# Patient Record
Sex: MALE | Race: BLACK OR AFRICAN AMERICAN | Employment: OTHER | URBAN - METROPOLITAN AREA
[De-identification: names, ages, dates, MRNs, and addresses within clinical notes are randomized per-mention and may not be internally consistent; named-entity substitution may affect disease eponyms.]

---

## 2017-11-03 ENCOUNTER — ANESTHESIA (OUTPATIENT)
Dept: ENDOSCOPY | Age: 77
End: 2017-11-03
Payer: MEDICARE

## 2017-11-03 ENCOUNTER — ANESTHESIA EVENT (OUTPATIENT)
Dept: ENDOSCOPY | Age: 77
End: 2017-11-03
Payer: MEDICARE

## 2017-11-03 ENCOUNTER — HOSPITAL ENCOUNTER (OUTPATIENT)
Age: 77
Setting detail: OUTPATIENT SURGERY
Discharge: HOME | End: 2017-11-03
Attending: COLON & RECTAL SURGERY | Admitting: COLON & RECTAL SURGERY
Payer: MEDICARE

## 2017-11-03 VITALS
OXYGEN SATURATION: 97 % | HEIGHT: 65 IN | DIASTOLIC BLOOD PRESSURE: 59 MMHG | BODY MASS INDEX: 27.49 KG/M2 | HEART RATE: 73 BPM | RESPIRATION RATE: 14 BRPM | WEIGHT: 165 LBS | SYSTOLIC BLOOD PRESSURE: 92 MMHG | TEMPERATURE: 98 F

## 2017-11-03 PROCEDURE — 74011000250 HC RX REV CODE- 250

## 2017-11-03 PROCEDURE — 76060000031 HC ANESTHESIA FIRST 0.5 HR: Performed by: COLON & RECTAL SURGERY

## 2017-11-03 PROCEDURE — 74011250637 HC RX REV CODE- 250/637: Performed by: COLON & RECTAL SURGERY

## 2017-11-03 PROCEDURE — 77030027957 HC TBNG IRR ENDOGTR BUSS -B: Performed by: COLON & RECTAL SURGERY

## 2017-11-03 PROCEDURE — 74011250636 HC RX REV CODE- 250/636: Performed by: COLON & RECTAL SURGERY

## 2017-11-03 PROCEDURE — 76040000019: Performed by: COLON & RECTAL SURGERY

## 2017-11-03 PROCEDURE — 74011250636 HC RX REV CODE- 250/636

## 2017-11-03 RX ORDER — SODIUM CHLORIDE 0.9 % (FLUSH) 0.9 %
5-10 SYRINGE (ML) INJECTION AS NEEDED
Status: ACTIVE | OUTPATIENT
Start: 2017-11-03 | End: 2017-11-03

## 2017-11-03 RX ORDER — FLUMAZENIL 0.1 MG/ML
0.2 INJECTION INTRAVENOUS
Status: ACTIVE | OUTPATIENT
Start: 2017-11-03 | End: 2017-11-03

## 2017-11-03 RX ORDER — ATROPINE SULFATE 0.1 MG/ML
0.5 INJECTION INTRAVENOUS
Status: ACTIVE | OUTPATIENT
Start: 2017-11-03 | End: 2017-11-03

## 2017-11-03 RX ORDER — WARFARIN SODIUM 5 MG/1
5 TABLET ORAL DAILY
COMMUNITY

## 2017-11-03 RX ORDER — SODIUM CHLORIDE 9 MG/ML
INJECTION, SOLUTION INTRAVENOUS
Status: DISCONTINUED | OUTPATIENT
Start: 2017-11-03 | End: 2017-11-03 | Stop reason: HOSPADM

## 2017-11-03 RX ORDER — NALOXONE HYDROCHLORIDE 0.4 MG/ML
0.4 INJECTION, SOLUTION INTRAMUSCULAR; INTRAVENOUS; SUBCUTANEOUS
Status: ACTIVE | OUTPATIENT
Start: 2017-11-03 | End: 2017-11-03

## 2017-11-03 RX ORDER — SODIUM CHLORIDE 0.9 % (FLUSH) 0.9 %
5-10 SYRINGE (ML) INJECTION EVERY 8 HOURS
Status: DISCONTINUED | OUTPATIENT
Start: 2017-11-03 | End: 2017-11-04 | Stop reason: HOSPADM

## 2017-11-03 RX ORDER — LIDOCAINE HYDROCHLORIDE 20 MG/ML
INJECTION, SOLUTION EPIDURAL; INFILTRATION; INTRACAUDAL; PERINEURAL AS NEEDED
Status: DISCONTINUED | OUTPATIENT
Start: 2017-11-03 | End: 2017-11-03 | Stop reason: HOSPADM

## 2017-11-03 RX ORDER — DEXTROMETHORPHAN/PSEUDOEPHED 2.5-7.5/.8
1.2 DROPS ORAL
Status: DISCONTINUED | OUTPATIENT
Start: 2017-11-03 | End: 2017-11-04 | Stop reason: HOSPADM

## 2017-11-03 RX ORDER — WARFARIN 2.5 MG/1
2.5 TABLET ORAL DAILY
COMMUNITY

## 2017-11-03 RX ORDER — SODIUM CHLORIDE 9 MG/ML
50 INJECTION, SOLUTION INTRAVENOUS CONTINUOUS
Status: DISPENSED | OUTPATIENT
Start: 2017-11-03 | End: 2017-11-03

## 2017-11-03 RX ORDER — EPINEPHRINE 0.1 MG/ML
1 INJECTION INTRACARDIAC; INTRAVENOUS
Status: ACTIVE | OUTPATIENT
Start: 2017-11-03 | End: 2017-11-03

## 2017-11-03 RX ORDER — PROPOFOL 10 MG/ML
INJECTION, EMULSION INTRAVENOUS AS NEEDED
Status: DISCONTINUED | OUTPATIENT
Start: 2017-11-03 | End: 2017-11-03 | Stop reason: HOSPADM

## 2017-11-03 RX ADMIN — LIDOCAINE HYDROCHLORIDE 40 MG: 20 INJECTION, SOLUTION EPIDURAL; INFILTRATION; INTRACAUDAL; PERINEURAL at 11:22

## 2017-11-03 RX ADMIN — PROPOFOL 50 MG: 10 INJECTION, EMULSION INTRAVENOUS at 11:32

## 2017-11-03 RX ADMIN — PROPOFOL 50 MG: 10 INJECTION, EMULSION INTRAVENOUS at 11:25

## 2017-11-03 RX ADMIN — PROPOFOL 50 MG: 10 INJECTION, EMULSION INTRAVENOUS at 11:29

## 2017-11-03 RX ADMIN — SODIUM CHLORIDE: 9 INJECTION, SOLUTION INTRAVENOUS at 11:18

## 2017-11-03 RX ADMIN — PROPOFOL 50 MG: 10 INJECTION, EMULSION INTRAVENOUS at 11:22

## 2017-11-03 NOTE — ANESTHESIA POSTPROCEDURE EVALUATION
Post-Anesthesia Evaluation and Assessment    Patient: Alivia Foster MRN: [id number]  SSN: [id number]    YOB: 1940  Age: 78 y.o. Sex: male       Cardiovascular Function/Vital Signs  Visit Vitals    BP 92/59    Pulse 73    Temp 36.7 °C (98 °F)    Resp 14    Ht 5' 5\" (1.651 m)    Wt 74.8 kg (165 lb)    SpO2 97%    BMI 27.46 kg/m2       Patient is status post MAC anesthesia for Procedure(s):  COLONOSCOPY. Nausea/Vomiting: None    Postoperative hydration reviewed and adequate. Pain:  Pain Scale 1: Numeric (0 - 10) (11/03/17 1142)  Pain Intensity 1: 0 (11/03/17 1142)   Managed    Neurological Status: At baseline    Mental Status and Level of Consciousness: Arousable    Pulmonary Status:   O2 Device: CO2 nasal cannula (11/03/17 1133)   Adequate oxygenation and airway patent    Complications related to anesthesia: None    Post-anesthesia assessment completed.  No concerns    Signed By: Jm Piper MD     November 3, 2017

## 2017-11-03 NOTE — ROUTINE PROCESS
Benedictnuvia Giles  1940  [id number]    Situation:  Verbal report received from: Lenora Levine  Procedure: Procedure(s):  COLONOSCOPY    Background:    Preoperative diagnosis: PER HX COLON POLYPS  Postoperative diagnosis: 1.- Diverticulosis    :  Dr. Rocky Casas  Assistant(s): Endoscopy Technician-1: Cris Kumar  Endoscopy RN-1: Kuldeep Morocho, RN    Specimens: * No specimens in log *  H. Pylori  no    Assessment:  Intra-procedure medications   Anesthesia gave intra-procedure sedation and medications, see anesthesia flow sheet yes    Intravenous fluids: NS@ KVO     Vital signs stable yes    Abdominal assessment: round and soft  Yes     Recommendation:  Discharge patient per MD order y.   Return to floor na  Family or Friend  fam  Permission to share finding with family or friend yes

## 2017-11-03 NOTE — OP NOTES
Vivian Acuña  [id number]  1940    COLON DISCHARGE INSTRUCTIONS  Discomfort:  Redness at IV site- apply warm compress to area; if redness or soreness persist- contact your physician  There may be a slight amount of blood passed from the rectum  Gaseous discomfort- walking, belching will help relieve any discomfort  You may not operate a vehicle for 12 hours  You may not engage in an occupation involving machinery or appliances for rest of today  You may not drink alcoholic beverages for at least 12 hours  Avoid making any critical decisions for at least 24 hour  DIET:   High fiber diet.  however -  remember your colon is empty and a heavy meal will produce gas. Avoid these foods:  vegetables, fried / greasy foods, carbonated drinks for today    MEDICATIONS:  Current Discharge Medication List      CONTINUE these medications which have NOT CHANGED    Details   !! warfarin (COUMADIN) 5 mg tablet Take 5 mg by mouth daily. Mon, Wed, Fri      !! warfarin (COUMADIN) 2.5 mg tablet Take 2.5 mg by mouth daily. Tues, Thurs, Sat, Sun      terazosin (HYTRIN) 5 mg capsule Take 5 mg by mouth daily. multivitamin (ONE A DAY) tablet Take 1 Tab by mouth daily. Cholecalciferol, Vitamin D3, (VITAMIN D3) 1,000 unit cap Take 1 Tab by mouth daily. cyanocobalamin (VITAMIN B-12) 500 mcg tablet Take 500 mcg by mouth daily. GLUC/CHND/OM3/DHA/EPA/FISH/STR (GLUCOSAMINE CHONDROITIN PLUS PO) Take 1 Tab by mouth daily. !! - Potential duplicate medications found. Please discuss with provider. ACTIVITY:  You may resume your normal daily activities it is recommended that you spend the remainder of the day resting -  avoid any strenuous activity. CALL M.D.   ANY SIGN OF:   Increasing pain, nausea, vomiting  Abdominal distension (swelling)  New increased bleeding (oral or rectal)  Fever (chills)  Pain in chest area  Bloody discharge from nose or mouth  Shortness of breath     Follow-up Instructions:   Call Eleazar Lima MD if any questions or problems. Telephone # [phone]  Biopsy results will be available in  7 to10 days  Should have a repeat colonoscopy in 5 years. COLONOSCOPY FINDINGS:  Your colonoscopy showed: normal colonoscopy.

## 2017-11-03 NOTE — DISCHARGE INSTRUCTIONS
Ana Joshua  [id number]  1940    COLON DISCHARGE INSTRUCTIONS  Discomfort:  Redness at IV site- apply warm compress to area; if redness or soreness persist- contact your physician  There may be a slight amount of blood passed from the rectum  Gaseous discomfort- walking, belching will help relieve any discomfort  You may not operate a vehicle for 12 hours  You may not engage in an occupation involving machinery or appliances for rest of today  You may not drink alcoholic beverages for at least 12 hours  Avoid making any critical decisions for at least 24 hour  DIET:   High fiber diet. - however -  remember your colon is empty and a heavy meal will produce gas. Avoid these foods:  vegetables, fried / greasy foods, carbonated drinks for today    MEDICATIONS:  resume       ACTIVITY:  You may resume your normal daily activities it is recommended that you spend the remainder of the day resting -  avoid any strenuous activity. CALL M.D. ANY SIGN OF:   Increasing pain, nausea, vomiting  Abdominal distension (swelling)  New increased bleeding (oral or rectal)  Fever (chills)  Pain in chest area  Bloody discharge from nose or mouth  Shortness of breath     Follow-up Instructions:   Call Jelani Juarez MD if any questions or problems. Telephone # [phone]  Biopsy results will be available in  7 to10 days  Should have a repeat colonoscopy in 5 years. COLONOSCOPY FINDINGS:  Your colonoscopy showed: normal colon.

## 2017-11-03 NOTE — IP AVS SNAPSHOT
[address] 
[phone] Patient: Oliver Dow MRN: [id number] [id number] About your hospitalization You were admitted on:  November 3, 2017 You last received care in the:  [facility] ENDOSCOPY You were discharged on:  November 3, 2017 Why you were hospitalized Your primary diagnosis was:  Not on File Things You Need To Do (next 8 weeks) Follow up with Ely Simmons MD  
  
Phone:  [phone] Where:  [address] [facility], [facility]. [facility] [id number] Discharge Orders None A check mini indicates which time of day the medication should be taken. My Medications TAKE these medications as instructed Instructions Each Dose to Equal  
 Morning Noon Evening Bedtime * COUMADIN 5 mg tablet Generic drug:  warfarin Your last dose was: Your next dose is: Take 5 mg by mouth daily. Mon, Wed, Fri  
 5 mg * warfarin 2.5 mg tablet Commonly known as:  COUMADIN Your last dose was: Your next dose is: Take 2.5 mg by mouth daily. Tues, Thurs, Sat, Sun  
 2.5 mg  
    
   
   
   
  
 GLUCOSAMINE CHONDROITIN PLUS PO Your last dose was: Your next dose is: Take 1 Tab by mouth daily. 1 Tab  
    
   
   
   
  
 multivitamin tablet Commonly known as:  ONE A DAY Your last dose was: Your next dose is: Take 1 Tab by mouth daily. 1 Tab  
    
   
   
   
  
 terazosin 5 mg capsule Commonly known as:  HYTRIN Your last dose was: Your next dose is: Take 5 mg by mouth daily. 5 mg VITAMIN B-12 500 mcg tablet Generic drug:  cyanocobalamin Your last dose was: Your next dose is: Take 500 mcg by mouth daily. 500 mcg VITAMIN D3 1,000 unit Cap Generic drug:  cholecalciferol Your last dose was: Your next dose is: Take 1 Tab by mouth daily. 1 Tab * Notice: This list has 2 medication(s) that are the same as other medications prescribed for you. Read the directions carefully, and ask your doctor or other care provider to review them with you. Discharge Instructions Phil Richard [id number] 1940 COLON DISCHARGE INSTRUCTIONS Discomfort: 
Redness at IV site- apply warm compress to area; if redness or soreness persist- contact your physician There may be a slight amount of blood passed from the rectum Gaseous discomfort- walking, belching will help relieve any discomfort You may not operate a vehicle for 12 hours You may not engage in an occupation involving machinery or appliances for rest of today You may not drink alcoholic beverages for at least 12 hours Avoid making any critical decisions for at least 24 hour DIET: 
 High fiber diet.  however -  remember your colon is empty and a heavy meal will produce gas. Avoid these foods:  vegetables, fried / greasy foods, carbonated drinks for today MEDICATIONS: 
resume ACTIVITY: 
You may resume your normal daily activities it is recommended that you spend the remainder of the day resting -  avoid any strenuous activity. CALL M.D. ANY SIGN OF: Increasing pain, nausea, vomiting Abdominal distension (swelling) New increased bleeding (oral or rectal) Fever (chills) Pain in chest area Bloody discharge from nose or mouth Shortness of breath Follow-up Instructions: 
 Call Za Herron MD if any questions or problems. Telephone # [phone] Biopsy results will be available in  7 to10 days Should have a repeat colonoscopy in 5 years. COLONOSCOPY FINDINGS: 
Your colonoscopy showed: normal colon. Introducing [facility]! Deirdre Denney introduces [employer] patient portal. Now you can access parts of your medical record, email your doctor's office, and request medication refills online. 1. In your internet browser, go to [email or web address] 2. Click on the First Time User? Click Here link in the Sign In box. You will see the New Member Sign Up page. 3. Enter your [employer] Access Code exactly as it appears below. You will not need to use this code after youve completed the sign-up process. If you do not sign up before the expiration date, you must request a new code. · [employer] Access Code: [email or web address] Expires: 2/1/2018 11:46 AM 
 
4. Enter the last four digits of your Social Security Number (xxxx) and Date of Birth (mm/dd/yyyy) as indicated and click Submit. You will be taken to the next sign-up page. 5. Create a [employer] ID. This will be your [employer] login ID and cannot be changed, so think of one that is secure and easy to remember. 6. Create a [employer] password. You can change your password at any time. 7. Enter your Password Reset Question and Answer. This can be used at a later time if you forget your password. 8. Enter your e-mail address. You will receive e-mail notification when new information is available in [address] 9. Click Sign Up. You can now view and download portions of your medical record. 10. Click the Download Summary menu link to download a portable copy of your medical information. If you have questions, please visit the Frequently Asked Questions section of the [employer] website. Remember, [employer] is NOT to be used for urgent needs. For medical emergencies, dial 911. Now available from your iPhone and Android! Providers Seen During Your Hospitalization Provider Specialty Primary office phone Sudeep Agrawal MD Colon and Rectal Surgery [phone] Your Primary Care Physician (PCP) Primary Care Physician Office Phone Office Fax Angélica Borja [phone] You are allergic to the following No active allergies Recent Documentation Height Weight BMI Smoking Status 1.651 m 74.8 kg 27.46 kg/m2 Never Smoker Emergency Contacts Name Discharge Info Relation Home Work Mobile Carmenza Carpenter  Other Relative [6] [phone] Patient Belongings The following personal items are in your possession at time of discharge: 
  Dental Appliances: None  Visual Aid: None Please provide this summary of care documentation to your next provider. Signatures-by signing, you are acknowledging that this After Visit Summary has been reviewed with you and you have received a copy. Patient Signature:  ____________________________________________________________ Date:  ____________________________________________________________  
  
[facility] Signature:  ____________________________________________________________ Date:  ____________________________________________________________

## 2017-11-03 NOTE — H&P
Colon and Rectal Surgery History and Physical    Subjective:      Marquis Luna is a 78 y.o. male who has hx polyps    There are no active problems to display for this patient. Past Medical History:   Diagnosis Date    Chronic kidney disease     kidney stones    Other ill-defined conditions     pt takes coumadin pt/family not sure why    Seizures ([address]     history      Past Surgical History:   Procedure Laterality Date    HX OTHER SURGICAL      kidney stones      Social History   Substance Use Topics    Smoking status: Never Smoker    Smokeless tobacco: Not on file    Alcohol use No      No family history on file. Prior to Admission medications    Medication Sig Start Date End Date Taking? Authorizing Provider   warfarin (COUMADIN) 5 mg tablet Take 5 mg by mouth daily. Mon, Wed, Fri   Yes Historical Provider   warfarin (COUMADIN) 2.5 mg tablet Take 2.5 mg by mouth daily. Tues, Thurs, Sat, Sun   Yes Historical Provider   terazosin (HYTRIN) 5 mg capsule Take 5 mg by mouth daily. Yes Historical Provider   multivitamin (ONE A DAY) tablet Take 1 Tab by mouth daily. Yes Historical Provider   Cholecalciferol, Vitamin D3, (VITAMIN D3) 1,000 unit cap Take 1 Tab by mouth daily. Yes Historical Provider   cyanocobalamin (VITAMIN B-12) 500 mcg tablet Take 500 mcg by mouth daily. Yes Historical Provider   GLUC/CHND/OM3/DHA/EPA/FISH/STR (GLUCOSAMINE CHONDROITIN PLUS PO) Take 1 Tab by mouth daily. Historical Provider     No Known Allergies     Review of Systems:    A comprehensive review of systems was negative except for that written in the History of Present Illness.     Objective:     Visit Vitals    /85    Pulse 67    Temp 98 °F (36.7 °C)    Resp 17    Ht 5' 5\" (1.651 m)    Wt 74.8 kg (165 lb)    SpO2 95%    BMI 27.46 kg/m2        Physical Exam:   nad  Chest clear  Heart reg  abd soft    Imaging:  images and reports reviewed    Lab Review:  No results found for this or any previous visit (from the past 24 hour(s)). Labs and radiology: images and reports reviewed      Assessment:     Hx valerio    Plan:     1. I recommend proceeding with colonoscopy. Treatment alternatives were discussed. 2. Discussed aspects of surgical intervention, methods, risks (including by not limited to infection, bleeding, hematoma, and perforation of the intestines or solid organs), and the risks of general anesthetic. The patient understands the risks; any and all questions were answered to the patient's satisfaction.     Signed By: Samaria Carcamo MD     November 3, 2017

## 2017-11-03 NOTE — ANESTHESIA PREPROCEDURE EVALUATION
Anesthetic History   No history of anesthetic complications            Review of Systems / Medical History  Patient summary reviewed, nursing notes reviewed and pertinent labs reviewed    Pulmonary  Within defined limits                 Neuro/Psych   Within defined limits  seizures         Cardiovascular  Within defined limits                     GI/Hepatic/Renal  Within defined limits              Endo/Other  Within defined limits           Other Findings              Physical Exam    Airway  Mallampati: II  TM Distance: > 6 cm  Neck ROM: normal range of motion   Mouth opening: Normal     Cardiovascular  Regular rate and rhythm,  S1 and S2 normal,  no murmur, click, rub, or gallop             Dental  No notable dental hx       Pulmonary  Breath sounds clear to auscultation               Abdominal  GI exam deferred       Other Findings            Anesthetic Plan    ASA: 3  Anesthesia type: MAC          Induction: Intravenous  Anesthetic plan and risks discussed with: Patient and Spouse

## 2017-11-03 NOTE — OP NOTES
Colonoscopy Procedure Note    Indications: Previous adenomatous polyp    Anesthesia/Sedation: MAC anesthesia Propofol    Pre-Procedure Exam:  Airway: clear   Heart: normal S1and S2    Lungs: clear bilateral  Abdomen: soft, nontender, bowel sounds present and normal in all quadrants   Mental Status: awake, alert, and oriented to person, place, and time      Procedure in Detail:  Informed consent was obtained for the procedure, including sedation. Risks of perforation, hemorrhage, adverse drug reaction, and aspiration were discussed. The patient was placed in the left lateral decubitus position. Based on the pre-procedure assessment, including review of the patient's medical history, medications, allergies, and review of systems, he had been deemed to be an appropriate candidate for moderate sedation; he was therefore sedated with the medications listed above. The patient was monitored continuously with ECG tracing, pulse oximetry, blood pressure monitoring, and direct observations. A rectal examination was performed. The [id number] was inserted into the rectum and advanced under direct vision to the cecum, which was identified by the ileocecal valve and appendiceal orifice. The quality of the colonic preparation was excellent. A careful inspection was made as the colonoscope was withdrawn, including a retroflexed view of the rectum; findings and interventions are described below. Appropriate photodocumentation was obtained. Findings:   ANUS: Anal exam reveals no masses or hemorrhoids, sphincter tone is normal.   RECTUM: Rectal exam reveals no masses or hemorrhoids. SIGMOID COLON: The mucosa is normal with good vascular pattern and without ulcers, diverticula, and polyps. DESCENDING COLON: The mucosa is normal with good vascular pattern and without ulcers, diverticula, and polyps.    SPLENIC FLEXURE: The splenic flexure is normal.   TRANSVERSE COLON: The mucosa is normal with good vascular pattern and without ulcers, diverticula, and polyps. HEPATIC FLEXURE: The hepatic flexure is normal.   ASCENDING COLON: The mucosa is normal with good vascular pattern and without ulcers, diverticula, and polyps. CECUM: The appendiceal orifice appears normal. The ileocecal valve appears normal.   TERMINAL ILEUM: The terminal ileum was not entered. Specimens: No specimens were collected. EBL: None    Complications: None; patient tolerated the procedure well. Attending Attestation: I performed the procedure. Recommendations:   - Repeat colonoscopy in 5 years.     Signed By: Marixa Vigil MD                        November 3, 2017

## 2019-06-10 ENCOUNTER — HOSPITAL ENCOUNTER (OUTPATIENT)
Dept: ULTRASOUND IMAGING | Age: 79
Discharge: HOME | End: 2019-06-10
Attending: FAMILY MEDICINE
Payer: MEDICARE

## 2019-06-10 DIAGNOSIS — M79.89 LEFT LEG SWELLING: ICD-10-CM

## 2019-06-10 PROCEDURE — 93971 EXTREMITY STUDY: CPT

## 2019-06-28 ENCOUNTER — APPOINTMENT (OUTPATIENT)
Dept: GENERAL RADIOLOGY | Age: 79
End: 2019-06-28
Attending: EMERGENCY MEDICINE
Payer: MEDICARE

## 2019-06-28 ENCOUNTER — HOSPITAL ENCOUNTER (EMERGENCY)
Age: 79
Discharge: HOME | End: 2019-06-28
Attending: EMERGENCY MEDICINE
Payer: MEDICARE

## 2019-06-28 VITALS
SYSTOLIC BLOOD PRESSURE: 158 MMHG | WEIGHT: 156.75 LBS | HEART RATE: 73 BPM | BODY MASS INDEX: 26.08 KG/M2 | DIASTOLIC BLOOD PRESSURE: 70 MMHG | RESPIRATION RATE: 14 BRPM | TEMPERATURE: 97.9 F | OXYGEN SATURATION: 94 %

## 2019-06-28 DIAGNOSIS — R82.79 URINE CULTURE POSITIVE: Primary | ICD-10-CM

## 2019-06-28 LAB
ALBUMIN SERPL-MCNC: 3.5 G/DL (ref 3.5–5)
ALBUMIN/GLOB SERPL: 0.9 {RATIO} (ref 1.1–2.2)
ALP SERPL-CCNC: 86 U/L (ref 45–117)
ALT SERPL-CCNC: 9 U/L (ref 12–78)
ANION GAP SERPL CALC-SCNC: 7 MMOL/L (ref 5–15)
APPEARANCE UR: CLEAR
AST SERPL-CCNC: 18 U/L (ref 15–37)
ATRIAL RATE: 89 BPM
BACTERIA URNS QL MICRO: ABNORMAL /HPF
BASOPHILS # BLD: 0.1 K/UL (ref 0–0.1)
BASOPHILS NFR BLD: 1 % (ref 0–1)
BILIRUB SERPL-MCNC: 0.6 MG/DL (ref 0.2–1)
BILIRUB UR QL: NEGATIVE
BUN SERPL-MCNC: 26 MG/DL (ref 6–20)
BUN/CREAT SERPL: 23 (ref 12–20)
CALCIUM SERPL-MCNC: 9.3 MG/DL (ref 8.5–10.1)
CALCULATED P AXIS, ECG09: 8 DEGREES
CALCULATED R AXIS, ECG10: 67 DEGREES
CALCULATED T AXIS, ECG11: 55 DEGREES
CHLORIDE SERPL-SCNC: 104 MMOL/L (ref 97–108)
CO2 SERPL-SCNC: 29 MMOL/L (ref 21–32)
COLOR UR: ABNORMAL
COMMENT, HOLDF: NORMAL
CREAT SERPL-MCNC: 1.15 MG/DL (ref 0.7–1.3)
DIAGNOSIS, 93000: NORMAL
DIFFERENTIAL METHOD BLD: NORMAL
EOSINOPHIL # BLD: 0.1 K/UL (ref 0–0.4)
EOSINOPHIL NFR BLD: 1 % (ref 0–7)
EPITH CASTS URNS QL MICRO: ABNORMAL /LPF
ERYTHROCYTE [DISTWIDTH] IN BLOOD BY AUTOMATED COUNT: 13.2 % (ref 11.5–14.5)
GLOBULIN SER CALC-MCNC: 3.9 G/DL (ref 2–4)
GLUCOSE SERPL-MCNC: 117 MG/DL (ref 65–100)
GLUCOSE UR STRIP.AUTO-MCNC: NEGATIVE MG/DL
HCT VFR BLD AUTO: 43.7 % (ref 36.6–50.3)
HGB BLD-MCNC: 14.5 G/DL (ref 12.1–17)
HGB UR QL STRIP: ABNORMAL
IMM GRANULOCYTES # BLD AUTO: 0 K/UL (ref 0–0.04)
IMM GRANULOCYTES NFR BLD AUTO: 0 % (ref 0–0.5)
KETONES UR QL STRIP.AUTO: NEGATIVE MG/DL
LACTATE SERPL-SCNC: 1.4 MMOL/L (ref 0.4–2)
LEUKOCYTE ESTERASE UR QL STRIP.AUTO: ABNORMAL
LYMPHOCYTES # BLD: 1.9 K/UL (ref 0.8–3.5)
LYMPHOCYTES NFR BLD: 34 % (ref 12–49)
MCH RBC QN AUTO: 27.9 PG (ref 26–34)
MCHC RBC AUTO-ENTMCNC: 33.2 G/DL (ref 30–36.5)
MCV RBC AUTO: 84 FL (ref 80–99)
MONOCYTES # BLD: 0.4 K/UL (ref 0–1)
MONOCYTES NFR BLD: 8 % (ref 5–13)
NEUTS SEG # BLD: 3.1 K/UL (ref 1.8–8)
NEUTS SEG NFR BLD: 56 % (ref 32–75)
NITRITE UR QL STRIP.AUTO: NEGATIVE
NRBC # BLD: 0 K/UL (ref 0–0.01)
NRBC BLD-RTO: 0 PER 100 WBC
P-R INTERVAL, ECG05: 144 MS
PH UR STRIP: 5.5 [PH] (ref 5–8)
PLATELET # BLD AUTO: 209 K/UL (ref 150–400)
PMV BLD AUTO: 9.3 FL (ref 8.9–12.9)
POTASSIUM SERPL-SCNC: 3.9 MMOL/L (ref 3.5–5.1)
PROT SERPL-MCNC: 7.4 G/DL (ref 6.4–8.2)
PROT UR STRIP-MCNC: NEGATIVE MG/DL
Q-T INTERVAL, ECG07: 354 MS
QRS DURATION, ECG06: 86 MS
QTC CALCULATION (BEZET), ECG08: 430 MS
RBC # BLD AUTO: 5.2 M/UL (ref 4.1–5.7)
RBC #/AREA URNS HPF: ABNORMAL /HPF (ref 0–5)
SAMPLES BEING HELD,HOLD: NORMAL
SODIUM SERPL-SCNC: 140 MMOL/L (ref 136–145)
SP GR UR REFRACTOMETRY: 1.02 (ref 1–1.03)
UR CULT HOLD, URHOLD: NORMAL
UROBILINOGEN UR QL STRIP.AUTO: 0.2 EU/DL (ref 0.2–1)
VENTRICULAR RATE, ECG03: 89 BPM
WBC # BLD AUTO: 5.6 K/UL (ref 4.1–11.1)
WBC URNS QL MICRO: ABNORMAL /HPF (ref 0–4)

## 2019-06-28 PROCEDURE — 81001 URINALYSIS AUTO W/SCOPE: CPT

## 2019-06-28 PROCEDURE — 87086 URINE CULTURE/COLONY COUNT: CPT

## 2019-06-28 PROCEDURE — 87186 SC STD MICRODIL/AGAR DIL: CPT

## 2019-06-28 PROCEDURE — 99284 EMERGENCY DEPT VISIT MOD MDM: CPT

## 2019-06-28 PROCEDURE — 36415 COLL VENOUS BLD VENIPUNCTURE: CPT

## 2019-06-28 PROCEDURE — 85025 COMPLETE CBC W/AUTO DIFF WBC: CPT

## 2019-06-28 PROCEDURE — 87040 BLOOD CULTURE FOR BACTERIA: CPT

## 2019-06-28 PROCEDURE — 71046 X-RAY EXAM CHEST 2 VIEWS: CPT

## 2019-06-28 PROCEDURE — 87077 CULTURE AEROBIC IDENTIFY: CPT

## 2019-06-28 PROCEDURE — 83605 ASSAY OF LACTIC ACID: CPT

## 2019-06-28 PROCEDURE — 93005 ELECTROCARDIOGRAM TRACING: CPT

## 2019-06-28 PROCEDURE — 80053 COMPREHEN METABOLIC PANEL: CPT

## 2019-06-28 NOTE — ED NOTES
The patient was discharged home in stable condition. The patient is alert and oriented, in no respiratory distress. The patient's diagnosis, condition and treatment were explained by ER Physician. The patient expressed understanding. A discharge plan has been developed. Discharge instructions were given. Pt wheeled out of the ED by ER RN.

## 2019-06-28 NOTE — ED PROVIDER NOTES
The history is provided by the patient and the spouse. The history is limited by the condition of the patient. No  was used. Bladder Infection    This is a recurrent problem. The current episode started more than 1 week ago. The problem has not changed since onset. The quality of the pain is described as burning. The pain is at a severity of 0/10. The patient is experiencing no pain. There has been no fever. He is not sexually active. There is no history of pyelonephritis. Pertinent negatives include no chills, no sweats, no nausea, no vomiting, no discharge, no frequency, no hematuria, no hesitancy, no urgency and no flank pain. He has tried antibiotics for the symptoms. His past medical history is significant for kidney stones, urological procedure and recurrent UTIs. His past medical history does not include single kidney, urinary stasis or catheterization. Past Medical History:   Diagnosis Date    Chronic kidney disease     kidney stones    Other ill-defined conditions(799.89)     pt takes coumadin pt/family not sure why    Seizures ([facility] [address]     history       Past Surgical History:   Procedure Laterality Date    COLONOSCOPY N/A 11/3/2017    COLONOSCOPY performed by Za Herron MD at [facility] ENDOSCOPY    HX OTHER SURGICAL      kidney stones         History reviewed. No pertinent family history.     Social History     Socioeconomic History    Marital status: SINGLE     Spouse name: Not on file    Number of children: Not on file    Years of education: Not on file    Highest education level: Not on file   Occupational History    Not on file   Social Needs    Financial resource strain: Not on file    Food insecurity:     Worry: Not on file     Inability: Not on file    Transportation needs:     Medical: Not on file     Non-medical: Not on file   Tobacco Use    Smoking status: Never Smoker   Substance and Sexual Activity    Alcohol use: No    Drug use: No    Sexual activity: Not on file   Lifestyle    Physical activity:     Days per week: Not on file     Minutes per session: Not on file    Stress: Not on file   Relationships    Social connections:     Talks on phone: Not on file     Gets together: Not on file     Attends Spiritism service: Not on file     Active member of club or organization: Not on file     Attends meetings of clubs or organizations: Not on file     Relationship status: Not on file    Intimate partner violence:     Fear of current or ex partner: Not on file     Emotionally abused: Not on file     Physically abused: Not on file     Forced sexual activity: Not on file   Other Topics Concern    Not on file   Social History Narrative    Not on file         ALLERGIES: Patient has no known allergies. Review of Systems   Constitutional: Negative for activity change, chills and fever. HENT: Negative for nosebleeds, sore throat, trouble swallowing and voice change. Eyes: Negative for visual disturbance. Respiratory: Negative for shortness of breath. Cardiovascular: Negative for chest pain and palpitations. Gastrointestinal: Negative for abdominal pain, constipation, diarrhea, nausea and vomiting. Genitourinary: Positive for dysuria. Negative for difficulty urinating, flank pain, frequency, hematuria, hesitancy and urgency. Musculoskeletal: Negative for back pain, neck pain and neck stiffness. Skin: Negative for color change. Allergic/Immunologic: Negative for immunocompromised state. Neurological: Positive for dizziness. Negative for seizures, syncope, weakness, light-headedness, numbness and headaches. Psychiatric/Behavioral: Negative for behavioral problems, confusion, hallucinations, self-injury and suicidal ideas.        Vitals:    06/28/19 1705   BP: 123/75   Pulse: 91   Resp: 16   Temp: 98.5 °F (36.9 °C)   SpO2: 92%   Weight: 71.1 kg (156 lb 12 oz)            Physical Exam   Constitutional: He appears well-developed and well-nourished. No distress. HENT:   Head: Normocephalic and atraumatic. Eyes: Pupils are equal, round, and reactive to light. Neck: Normal range of motion. Neck supple. Cardiovascular: Normal rate, regular rhythm and normal heart sounds. Exam reveals no gallop and no friction rub. No murmur heard. Pulmonary/Chest: Effort normal and breath sounds normal. No respiratory distress. He has no wheezes. Abdominal: Soft. Bowel sounds are normal. He exhibits no distension. There is no tenderness. There is no rebound and no guarding. Musculoskeletal: Normal range of motion. Neurological: He is alert. He is disoriented. GCS eye subscore is 4. GCS verbal subscore is 5. GCS motor subscore is 6. Skin: Skin is warm. No rash noted. He is not diaphoretic. Psychiatric: He has a normal mood and affect. His behavior is normal. Judgment and thought content normal.   Nursing note and vitals reviewed. MDM     This is a 70year old male, with past medical history, review of systems, physical exam as above presenting with complaints of abnormal lab values. Patient with a history of Parkinson's disease, is noted to be a poor historian. Wife at the bedside states he was treated for urinary tract infection approximately one month ago, initially detected during a check up, however wife states he was asymptomatic at the time. Wife states he has been through 2 courses of different antibiotics over the last month, with culture results obtained this week showing ESBL. Klebsiella. Upon arrival, patient awake, alert, without complaints, noted to have tachycardia or hypotension, afebrile. Question colonization versus acute infection. Plan to obtain CMP, CBC, UA, chest x-ray, we'll likely start empiric antibiotics, admit, for further care and evaluation. Procedures    6:29 PM  Patient with unremarkable VS, labwork as well, unimpressive UA, called PCP on call (Dr. Pedro Luis Avila), she will access her won EMR and call back.      Gloria Cates unable to access Dr. Shane Waller records, patient well appearing, unimpressive UA and lab work. Will send cx, DC home with PCP f/u, return precautions given.

## 2019-06-28 NOTE — DISCHARGE INSTRUCTIONS
Patient Education        Urine Culture: About This Test  What is it? A urine culture is a test to find germs (such as bacteria) that can cause an infection. A sample of urine is added to a substance that promotes the growth of germs. If no germs grow, the culture is negative. If germs that can cause infection grow, the culture is positive. The type of germ may be identified using a microscope or chemical tests. Why is this test done? A urine culture may be done to:  · Find the cause of a urinary tract infection (UTI). · Make decisions about the best treatment for a UTI. · Find out whether treatment for a UTI worked. How can you prepare for the test?  You don't need to do anything before you have the test. If you are taking or have recently taken antibiotics, tell your doctor. What happens before the test?  You will need to drink enough fluids and avoid urinating so that you will be able to collect a urine sample. What happens during the test?  You will be asked to collect a clean-catch midstream urine sample for testing. The first urine of the day is best because bacteria levels will be higher. · Wash your hands before collecting the urine. · If the container has a lid, remove the lid of the container and set it down with the inner surface up. · Clean the area around your penis or vagina. · Begin urinating into the toilet or urinal.  · After the urine has flowed for several seconds, place the collection container in the stream and collect about 2 ounces (a quarter cup) of this \"midstream\" urine without stopping the flow. · Don't touch the rim of the container to your genital area. · Finish urinating into the toilet or urinal.  · Carefully replace the lid on the container. · Wash your hands. How long does the test take? · The test will take a few minutes. What happens after the test?  · You will probably be able to go home right away.  The results of a urine culture are usually available in 1 to 3 days.  · You can go back to your usual activities right away. Follow-up care is a key part of your treatment and safety. Be sure to make and go to all appointments, and call your doctor if you are having problems. It's also a good idea to keep a list of the medicines you take. Ask your doctor when you can expect to have your test results. Where can you learn more? Go to http://tresa-maris.info/. Enter O643 in the search box to learn more about \"Urine Culture: About This Test.\"  Current as of: June 25, 2018  Content Version: 11.9  © 3417-4366 [employer]. Care instructions adapted under license by [employer] (which disclaims liability or warranty for this information). If you have questions about a medical condition or this instruction, always ask your healthcare professional. [address] any warranty or liability for your use of this information.

## 2019-06-30 LAB
BACTERIA SPEC CULT: ABNORMAL
BACTERIA SPEC CULT: ABNORMAL
CC UR VC: ABNORMAL
SERVICE CMNT-IMP: ABNORMAL

## 2019-06-30 NOTE — PROGRESS NOTES
11:28 AM  Notified by Lab of ESBL in urine/ per record, Pt already aware/ seen 2 d ago/     Called pt at [phone] and msg left

## 2019-07-03 LAB
BACTERIA SPEC CULT: NORMAL
SERVICE CMNT-IMP: NORMAL

## 2020-02-02 ENCOUNTER — HOSPITAL ENCOUNTER (EMERGENCY)
Age: 80
Discharge: HOME | End: 2020-02-02
Attending: EMERGENCY MEDICINE
Payer: MEDICARE

## 2020-02-02 ENCOUNTER — APPOINTMENT (OUTPATIENT)
Dept: CT IMAGING | Age: 80
End: 2020-02-02
Attending: EMERGENCY MEDICINE
Payer: MEDICARE

## 2020-02-02 VITALS
OXYGEN SATURATION: 95 % | TEMPERATURE: 98.7 F | BODY MASS INDEX: 25.39 KG/M2 | HEART RATE: 72 BPM | RESPIRATION RATE: 16 BRPM | WEIGHT: 152.56 LBS | SYSTOLIC BLOOD PRESSURE: 136 MMHG | DIASTOLIC BLOOD PRESSURE: 84 MMHG

## 2020-02-02 DIAGNOSIS — S09.90XA CLOSED HEAD INJURY, INITIAL ENCOUNTER: Primary | ICD-10-CM
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
## 2020-02-02 LAB — INR BLD: 2.2 (ref 0.9–1.2)

## 2020-02-02 PROCEDURE — 74011250637 HC RX REV CODE- 250/637: Performed by: EMERGENCY MEDICINE

## 2020-02-02 PROCEDURE — 70450 CT HEAD/BRAIN W/O DYE: CPT

## 2020-02-02 PROCEDURE — 99284 EMERGENCY DEPT VISIT MOD MDM: CPT

## 2020-02-02 PROCEDURE — 85610 PROTHROMBIN TIME: CPT

## 2020-02-02 RX ORDER — ACETAMINOPHEN 500 MG
1000 TABLET ORAL
Status: COMPLETED | OUTPATIENT
Start: 2020-02-02 | End: 2020-02-02

## 2020-02-02 RX ADMIN — ACETAMINOPHEN 1000 MG: 500 TABLET ORAL at 02:24
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
## 2020-02-02 NOTE — ED NOTES
I have reviewed discharge instructions with Vivian Acuña. He verbalized understanding of all discharge instructions and follow up care. Discharge summary was signed with no further questions or concerns. He is A&Ox4. Respirations are even and unlabored. Skin is warm and dry. No signs of acute distress noted at discharge. He was assisted out of ED via wheelchair accompanied by family.
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
## 2022-05-16 ENCOUNTER — ANESTHESIA EVENT (OUTPATIENT)
Dept: ENDOSCOPY | Age: 82
End: 2022-05-16
Payer: MEDICARE

## 2022-05-16 ENCOUNTER — HOSPITAL ENCOUNTER (OUTPATIENT)
Age: 82
Setting detail: OUTPATIENT SURGERY
Discharge: HOME | End: 2022-05-16
Attending: INTERNAL MEDICINE | Admitting: INTERNAL MEDICINE
Payer: MEDICARE

## 2022-05-16 ENCOUNTER — ANESTHESIA (OUTPATIENT)
Dept: ENDOSCOPY | Age: 82
End: 2022-05-16
Payer: MEDICARE

## 2022-05-16 VITALS
DIASTOLIC BLOOD PRESSURE: 78 MMHG | TEMPERATURE: 97.5 F | HEART RATE: 61 BPM | RESPIRATION RATE: 13 BRPM | WEIGHT: 140 LBS | HEIGHT: 68 IN | BODY MASS INDEX: 21.22 KG/M2 | SYSTOLIC BLOOD PRESSURE: 126 MMHG | OXYGEN SATURATION: 96 %

## 2022-05-16 PROCEDURE — 74011250636 HC RX REV CODE- 250/636: Performed by: NURSE ANESTHETIST, CERTIFIED REGISTERED

## 2022-05-16 PROCEDURE — 76040000019: Performed by: INTERNAL MEDICINE

## 2022-05-16 PROCEDURE — 76060000031 HC ANESTHESIA FIRST 0.5 HR: Performed by: INTERNAL MEDICINE

## 2022-05-16 PROCEDURE — 2709999900 HC NON-CHARGEABLE SUPPLY: Performed by: INTERNAL MEDICINE

## 2022-05-16 RX ORDER — ASPIRIN 81 MG/1
81 TABLET ORAL DAILY
COMMUNITY

## 2022-05-16 RX ORDER — DEXTROMETHORPHAN/PSEUDOEPHED 2.5-7.5/.8
1.2 DROPS ORAL
Status: DISCONTINUED | OUTPATIENT
Start: 2022-05-16 | End: 2022-05-16 | Stop reason: HOSPADM

## 2022-05-16 RX ORDER — NALOXONE HYDROCHLORIDE 0.4 MG/ML
0.4 INJECTION, SOLUTION INTRAMUSCULAR; INTRAVENOUS; SUBCUTANEOUS
Status: DISCONTINUED | OUTPATIENT
Start: 2022-05-16 | End: 2022-05-16 | Stop reason: HOSPADM

## 2022-05-16 RX ORDER — SODIUM CHLORIDE 0.9 % (FLUSH) 0.9 %
5-40 SYRINGE (ML) INJECTION EVERY 8 HOURS
Status: DISCONTINUED | OUTPATIENT
Start: 2022-05-16 | End: 2022-05-16 | Stop reason: HOSPADM

## 2022-05-16 RX ORDER — ATROPINE SULFATE 0.1 MG/ML
0.5 INJECTION INTRAVENOUS
Status: DISCONTINUED | OUTPATIENT
Start: 2022-05-16 | End: 2022-05-16 | Stop reason: HOSPADM

## 2022-05-16 RX ORDER — SODIUM CHLORIDE 9 MG/ML
50 INJECTION, SOLUTION INTRAVENOUS CONTINUOUS
Status: DISCONTINUED | OUTPATIENT
Start: 2022-05-16 | End: 2022-05-16 | Stop reason: HOSPADM

## 2022-05-16 RX ORDER — SODIUM CHLORIDE 9 MG/ML
INJECTION, SOLUTION INTRAVENOUS
Status: DISCONTINUED | OUTPATIENT
Start: 2022-05-16 | End: 2022-05-16 | Stop reason: HOSPADM

## 2022-05-16 RX ORDER — EPINEPHRINE 0.1 MG/ML
1 INJECTION INTRACARDIAC; INTRAVENOUS
Status: DISCONTINUED | OUTPATIENT
Start: 2022-05-16 | End: 2022-05-16 | Stop reason: HOSPADM

## 2022-05-16 RX ORDER — CARBIDOPA AND LEVODOPA 10; 100 MG/1; MG/1
1 TABLET ORAL 3 TIMES DAILY
COMMUNITY

## 2022-05-16 RX ORDER — PROPOFOL 10 MG/ML
INJECTION, EMULSION INTRAVENOUS AS NEEDED
Status: DISCONTINUED | OUTPATIENT
Start: 2022-05-16 | End: 2022-05-16 | Stop reason: HOSPADM

## 2022-05-16 RX ORDER — FLUMAZENIL 0.1 MG/ML
0.2 INJECTION INTRAVENOUS
Status: DISCONTINUED | OUTPATIENT
Start: 2022-05-16 | End: 2022-05-16 | Stop reason: HOSPADM

## 2022-05-16 RX ORDER — SODIUM CHLORIDE 0.9 % (FLUSH) 0.9 %
5-40 SYRINGE (ML) INJECTION AS NEEDED
Status: DISCONTINUED | OUTPATIENT
Start: 2022-05-16 | End: 2022-05-16 | Stop reason: HOSPADM

## 2022-05-16 RX ADMIN — SODIUM CHLORIDE: 900 INJECTION, SOLUTION INTRAVENOUS at 08:28

## 2022-05-16 RX ADMIN — PROPOFOL 100 MG: 10 INJECTION, EMULSION INTRAVENOUS at 08:43

## 2022-05-16 RX ADMIN — PROPOFOL 50 MG: 10 INJECTION, EMULSION INTRAVENOUS at 08:50

## 2022-05-16 RX ADMIN — PROPOFOL 50 MG: 10 INJECTION, EMULSION INTRAVENOUS at 08:48

## 2022-05-16 NOTE — PROCEDURES
[facility].  [address] [place] [address] [address], [address]  [phone]                              Colonoscopy Procedure Note      Indications:    Screening colonoscopy     :  Edwige Reyez MD    Staff: Endoscopy Technician-1: Salvatore Beth  Endoscopy RN-1: Terry Claudio RN    Referring Provider: Nuris Meng MD    Sedation: MAC    Procedure Details:  After informed consent was obtained with all risks and benefits of procedure explained and preoperative exam completed, the patient was taken to the endoscopy suite and placed in the left lateral decubitus position. Upon sequential sedation as per above, a digital rectal exam was performed per below. The Olympus videocolonoscope was inserted in the rectum and carefully advanced to the cecum, which was identified by the ileocecal valve and appendiceal orifice. The quality of preparation was excellent. [place] Bowel Prep Score : 3/3/3/9 . The colonoscope was slowly withdrawn with careful evaluation between folds. Retroflexion in the rectum was performed. Findings:   Rectum: normal   Sigmoid: mild diverticulosis  Descending Colon: normal  Transverse Colon: normal  Ascending Colon: normal  Cecum: normal       Interventions:  none    Specimen Removed:  * No specimens in log *    Complications: None. EBL:  none    Impression:    See Postoperative diagnosis above    Recommendations:   - Resume normal medications. - Resume previous diet. - Based on clinical practice guidelines established by the [place] Preventive Services Task Force, no further surveillance colonoscopy is indicated. Discharge Disposition:  Home in the company of a  when able to ambulate.     Edwige Reyez MD  5/16/2022  9:06 AM

## 2022-05-16 NOTE — PROGRESS NOTES

## 2022-05-16 NOTE — H&P
Pre-Endoscopy H&P   Chief complaint: screening or surveillance of previous colon polyps    HPI:  Patient presents for procedure. The indication for the procedure, the patient's history and the patient's current medications are reviewed prior to the procedure and that data is reported on the endoscopy note in the chart to which this document is attached. Any significant complaints with regard to organ systems will be noted, and if not mentioned then a review of systems is not contributory. Past Medical History:   Diagnosis Date    Chronic kidney disease     kidney stones    Ill-defined condition     parkensens disease    Other ill-defined conditions(799.89)     pt takes coumadin pt/family not sure why    Seizures ([facility] [address]     history      Past Surgical History:   Procedure Laterality Date    COLONOSCOPY N/A 11/3/2017    COL[facility] Malina Moses MD at [facility] END[facility]COPY    HX HEENT      cateract surgery-bilateral    HX OTHER SURGICAL      kidney stones     Social   Social History     Tobacco Use    Smoking status: Never Smoker    Smokeless tobacco: Never Used   Substance Use Topics    Alcohol use: No      History reviewed. No pertinent family history. No Known Allergies   Prior to Admission Medications   Prescriptions Last Dose Informant Patient Reported? Taking? Cholecalciferol, Vitamin D3, (VITAMIN D3) 1,000 unit cap 5/9/2022 at Unknown time  Yes Yes   Sig: Take 1 Tab by mouth daily. aspirin delayed-release 81 mg tablet 5/5/2022  Yes Yes   Sig: Take 81 mg by mouth daily. carbidopa-levodopa (SINEMET)  mg per tablet Unknown at Unknown time  Yes No   Sig: Take 1 Tablet by mouth three (3) times daily. Dosage and frequency unknown   cyanocobalamin (VITAMIN B-12) 500 mcg tablet 5/9/2022 at Unknown time  Yes Yes   Sig: Take 500 mcg by mouth daily. multivitamin (ONE A DAY) tablet 5/9/2022 at Unknown time  Yes Yes   Sig: Take 1 Tab by mouth daily.    terazosin (HYTRIN) 5 mg capsule 5/9/2022 at Unknown time  Yes Yes   Sig: Take 5 mg by mouth daily. warfarin (COUMADIN) 2.5 mg tablet Not Taking at Unknown time  Yes No   Sig: Take 2.5 mg by mouth daily. [place]   Patient not taking: Reported on 5/16/2022   warfarin (COUMADIN) 5 mg tablet Not Taking at Unknown time  Yes No   Sig: Take 5 mg by mouth daily. Mon, Wed, Fri   Patient not taking: Reported on 5/16/2022      Facility-Administered Medications: None       PHYSICAL EXAM:  The patient is examined immediately prior to the procedure. Visit Vitals  BP (!) 155/89   Pulse 75   Temp 98.6 °F (37 °C)   Resp 16   Ht 5' 8\" (1.727 m)   Wt 63.5 kg (140 lb)   SpO2 99%   BMI 21.29 kg/m²     Gen: Appears comfortable, no distress. Pulm: comfortable respirations with no abnormal audible breath sounds  CV: heart regular, well perfused  GI: abdomen flat. ASSESSMENT:  Patient here for procedure. The indication for the procedure, the patient's history and the patient's current medications are reviewed prior to the procedure and that data is reported on the endoscopy note in the chart to which this document is attached. PLAN:  Informed consent discussion held, patient afforded an opportunity to ask questions and all questions answered. After being advised of the risks, benefits, and alternatives, the patient requested that we proceed and indicated so on a written consent form. Will proceed with procedure as planned.   Kate Majano MD

## 2022-05-16 NOTE — DISCHARGE INSTRUCTIONS
[id number] [facility] Polo Nielsen MD  [phone]      May 16, 2022    Nilsa Duffy  YOB: 1940    COLONOSCOPY DISCHARGE INSTRUCTIONS    If there is redness at IV site you should apply warm compress to area. If redness or soreness persist contact Dr. Richard Nielsen or your primary care doctor. There may be a slight amount of blood passed from the rectum. Gaseous discomfort may develop, but walking, belching will help relieve this. You may not operate a vehicle for 12 hours  You may not operate machinery or dangerous appliances for rest of today  You may not drink alcoholic beverages for 12 hours  Avoid making any critical decisions for 24 hours    DIET:  You may resume your normal diet, but some patients find that heavy or large meals may lead to indigestion or vomiting. I suggest a light meal as first food intake. MEDICATIONS:  The use of some over-the-counter pain medication may lead to bleeding after colon biopsies or polyp removal.  Tylenol (also called acetaminophen) is safe to take even if you have had colonoscopy with polyp removal.  Based on the procedure you can resume baby-dose aspirin (81 mg) and may safely take anticoagulation (like apixaban (Eliquis), dabigatran (Pradaxa), edoxaban (Dauna Phi), rivaroxaban (Xarelto) or warfarin (Coumadin)) or antiplatelet medications (high dose aspirin 325mg, Clopidogrel (Plavix), Prasugrel (Effient), Ticagrelor (Brilinta))for the next two (48 hours) days. ACTIVITY:  You may resume your normal household activities, but it is recommended that you spend the remainder of the day resting -  avoid any strenuous activity. CALL DR. LEONARD'S OFFICE IF:  Increasing pain, nausea, vomiting  Abdominal distension (swelling)  Significant new or increased bleeding (oral or rectal)  Fever/Chills  Chest pain/shortness of breath                       Additional instructions:   No polyps identified on this exam.  Based on clinical practice guidelines established by the [place] Preventive Services Task Force, no further surveillance colonoscopy is indicated. It was an honor to be your doctor today. Please let me or my office staff know if you have any feedback about today's procedure. Darryle Hopkins, MD, May 16, 2022    Colonoscopy saves lives, and can prevent colon cancer. Everyone aged 48 or older needs colonoscopy.   Tell your family and friends: get the test!

## 2022-05-16 NOTE — ANESTHESIA PREPROCEDURE EVALUATION
Anesthetic History   No history of anesthetic complications            Review of Systems / Medical History  Patient summary reviewed, nursing notes reviewed and pertinent labs reviewed    Pulmonary  Within defined limits                 Neuro/Psych   Within defined limits  seizures         Cardiovascular  Within defined limits                     GI/Hepatic/Renal  Within defined limits              Endo/Other  Within defined limits           Other Findings              Physical Exam    Airway  Mallampati: I  TM Distance: > 6 cm  Neck ROM: normal range of motion   Mouth opening: Normal     Cardiovascular  Regular rate and rhythm,  S1 and S2 normal,  no murmur, click, rub, or gallop             Dental    Dentition: Edentulous     Pulmonary  Breath sounds clear to auscultation               Abdominal  GI exam deferred       Other Findings            Anesthetic Plan    ASA: 3  Anesthesia type: MAC          Induction: Intravenous  Anesthetic plan and risks discussed with: Patient and Spouse

## 2022-05-20 NOTE — ANESTHESIA POSTPROCEDURE EVALUATION
Post-Anesthesia Evaluation and Assessment    Patient: Joshua Durant MRN: [id number]  SSN: [id number]    YOB: 1940  Age: 80 y.o. Sex: male      I have evaluated the patient and they are stable and ready for discharge from the PACU. Cardiovascular Function/Vital Signs  Visit Vitals  /78   Pulse 61   Temp 36.4 °C (97.5 °F)   Resp 13   Ht 5' 8\" (1.727 m)   Wt 63.5 kg (140 lb)   SpO2 96%   BMI 21.29 kg/m²       Patient is status post MAC anesthesia for Procedure(s):  COLONOSCOPY. Nausea/Vomiting: None    Postoperative hydration reviewed and adequate. Pain:  Pain Scale 1: Adult Nonverbal Pain Scale (05/16/22 0934)  Pain Intensity 1: 0 (05/16/22 0934)   Managed    Neurological Status: At baseline    Mental Status, Level of Consciousness: Alert and  oriented to person, place, and time    Pulmonary Status:   O2 Device: None (Room air) (05/16/22 0920)   Adequate oxygenation and airway patent    Complications related to anesthesia: None    Post-anesthesia assessment completed. No concerns    Signed By: Cherelle Alcantar MD     May 20, 2022              Procedure(s):  COLONOSCOPY.     MAC    <BSHSIANPOST>    INITIAL Post-op Vital signs:   Vitals Value Taken Time   /78 05/16/22 0920   Temp 36.4 °C (97.5 °F) 05/16/22 0934   Pulse 61 05/16/22 0920   Resp 13 05/16/22 0920   SpO2 96 % 05/16/22 0920

## 2022-08-08 ENCOUNTER — HOSPITAL ENCOUNTER (OUTPATIENT)
Dept: GENERAL RADIOLOGY | Age: 82
Discharge: HOME | End: 2022-08-08
Attending: FAMILY MEDICINE
Payer: MEDICARE

## 2022-08-08 ENCOUNTER — TRANSCRIBE ORDER (OUTPATIENT)
Dept: REGISTRATION | Age: 82
End: 2022-08-08

## 2022-08-08 DIAGNOSIS — R05.3 CHRONIC COUGH: Primary | ICD-10-CM

## 2022-08-08 DIAGNOSIS — R05.3 CHRONIC COUGH: ICD-10-CM

## 2022-08-08 PROCEDURE — 71046 X-RAY EXAM CHEST 2 VIEWS: CPT

## 2022-10-05 NOTE — BRIEF OP NOTE
BRIEF OPERATIVE NOTE    Date of Procedure: 11/3/2017   Preoperative Diagnosis: PER HX COLON POLYPS  Postoperative Diagnosis: 1.- Diverticulosis    Procedure(s):  COLONOSCOPY  Surgeon(s) and Role:     * Adelfo Bell MD - Primary         Assistant Staff:       Surgical Staff:  Endoscopy Technician-1: Obed Delacruz  Endoscopy RN-1: Annia Perez RN  No case tracking events are documented in the log.   Anesthesia: MAC   Estimated Blood Loss: none  Specimens: * No specimens in log *   Findings: normal colon   Complications: none apparent  Implants: * No implants in log * Photo Preface (Leave Blank If You Do Not Want): Photographs were obtained today. Detail Level: Zone

## 2023-12-04 ENCOUNTER — HOSPITAL ENCOUNTER (EMERGENCY)
Facility: HOSPITAL | Age: 83
Discharge: HOME | End: 2023-12-04
Attending: STUDENT IN AN ORGANIZED HEALTH CARE EDUCATION/TRAINING PROGRAM
Payer: MEDICARE

## 2023-12-04 VITALS
RESPIRATION RATE: 16 BRPM | DIASTOLIC BLOOD PRESSURE: 88 MMHG | OXYGEN SATURATION: 97 % | SYSTOLIC BLOOD PRESSURE: 139 MMHG | HEART RATE: 100 BPM | TEMPERATURE: 99.7 F

## 2023-12-04 DIAGNOSIS — N30.01 ACUTE CYSTITIS WITH HEMATURIA: Primary | ICD-10-CM

## 2023-12-04 LAB
ALBUMIN SERPL-MCNC: 3.3 G/DL (ref 3.5–5)
ALBUMIN/GLOB SERPL: 0.7 (ref 1.1–2.2)
ALP SERPL-CCNC: 112 U/L (ref 45–117)
ALT SERPL-CCNC: 14 U/L (ref 12–78)
ANION GAP SERPL CALC-SCNC: 6 MMOL/L (ref 5–15)
APPEARANCE UR: CLEAR
AST SERPL-CCNC: 52 U/L (ref 15–37)
BACTERIA URNS QL MICRO: ABNORMAL /HPF
BASOPHILS # BLD: 0 K/UL (ref 0–0.1)
BASOPHILS NFR BLD: 0 % (ref 0–1)
BILIRUB SERPL-MCNC: 1 MG/DL (ref 0.2–1)
BILIRUB UR QL: NEGATIVE
BUN SERPL-MCNC: 20 MG/DL (ref 6–20)
BUN/CREAT SERPL: 24 (ref 12–20)
CALCIUM SERPL-MCNC: 9.5 MG/DL (ref 8.5–10.1)
CHLORIDE SERPL-SCNC: 104 MMOL/L (ref 97–108)
CO2 SERPL-SCNC: 29 MMOL/L (ref 21–32)
COLOR UR: ABNORMAL
CREAT SERPL-MCNC: 0.85 MG/DL (ref 0.7–1.3)
DIFFERENTIAL METHOD BLD: ABNORMAL
EOSINOPHIL # BLD: 0 K/UL (ref 0–0.4)
EOSINOPHIL NFR BLD: 0 % (ref 0–7)
EPITH CASTS URNS QL MICRO: ABNORMAL /LPF
ERYTHROCYTE [DISTWIDTH] IN BLOOD BY AUTOMATED COUNT: 13.2 % (ref 11.5–14.5)
GLOBULIN SER CALC-MCNC: 4.8 G/DL (ref 2–4)
GLUCOSE SERPL-MCNC: 109 MG/DL (ref 65–100)
GLUCOSE UR STRIP.AUTO-MCNC: NEGATIVE MG/DL
HCT VFR BLD AUTO: 45.2 % (ref 36.6–50.3)
HGB BLD-MCNC: 14.3 G/DL (ref 12.1–17)
HGB UR QL STRIP: ABNORMAL
IMM GRANULOCYTES # BLD AUTO: 0 K/UL (ref 0–0.04)
IMM GRANULOCYTES NFR BLD AUTO: 0 % (ref 0–0.5)
KETONES UR QL STRIP.AUTO: NEGATIVE MG/DL
LEUKOCYTE ESTERASE UR QL STRIP.AUTO: ABNORMAL
LYMPHOCYTES # BLD: 0.8 K/UL (ref 0.8–3.5)
LYMPHOCYTES NFR BLD: 9 % (ref 12–49)
MCH RBC QN AUTO: 27.1 PG (ref 26–34)
MCHC RBC AUTO-ENTMCNC: 31.6 G/DL (ref 30–36.5)
MCV RBC AUTO: 85.6 FL (ref 80–99)
MONOCYTES # BLD: 0.6 K/UL (ref 0–1)
MONOCYTES NFR BLD: 6 % (ref 5–13)
NEUTS SEG # BLD: 8.3 K/UL (ref 1.8–8)
NEUTS SEG NFR BLD: 85 % (ref 32–75)
NITRITE UR QL STRIP.AUTO: NEGATIVE
NRBC # BLD: 0 K/UL (ref 0–0.01)
NRBC BLD-RTO: 0 PER 100 WBC
PH UR STRIP: 5.5 (ref 5–8)
PLATELET # BLD AUTO: 186 K/UL (ref 150–400)
PMV BLD AUTO: 9.2 FL (ref 8.9–12.9)
POTASSIUM SERPL-SCNC: 5.2 MMOL/L (ref 3.5–5.1)
PROT SERPL-MCNC: 8.1 G/DL (ref 6.4–8.2)
PROT UR STRIP-MCNC: NEGATIVE MG/DL
RBC # BLD AUTO: 5.28 M/UL (ref 4.1–5.7)
RBC #/AREA URNS HPF: ABNORMAL /HPF (ref 0–5)
SODIUM SERPL-SCNC: 139 MMOL/L (ref 136–145)
SP GR UR REFRACTOMETRY: 1.02 (ref 1–1.03)
UROBILINOGEN UR QL STRIP.AUTO: 0.2 EU/DL (ref 0.2–1)
WBC # BLD AUTO: 9.8 K/UL (ref 4.1–11.1)
WBC URNS QL MICRO: ABNORMAL /HPF (ref 0–4)

## 2023-12-04 PROCEDURE — 85025 COMPLETE CBC W/AUTO DIFF WBC: CPT

## 2023-12-04 PROCEDURE — 87086 URINE CULTURE/COLONY COUNT: CPT

## 2023-12-04 PROCEDURE — 99283 EMERGENCY DEPT VISIT LOW MDM: CPT

## 2023-12-04 PROCEDURE — 80053 COMPREHEN METABOLIC PANEL: CPT

## 2023-12-04 PROCEDURE — 81001 URINALYSIS AUTO W/SCOPE: CPT

## 2023-12-04 PROCEDURE — 36415 COLL VENOUS BLD VENIPUNCTURE: CPT

## 2023-12-04 RX ORDER — CEPHALEXIN 500 MG/1
500 CAPSULE ORAL 4 TIMES DAILY
Qty: 40 CAPSULE | Refills: 0 | Status: SHIPPED | OUTPATIENT
Start: 2023-12-04 | End: 2023-12-14

## 2023-12-04 NOTE — ED NOTES
The patient left the Emergency Department via walker, alert,  and in no acute distress. The patient's family was encouraged to call or return to the ED for worsening issues or problems and was encouraged to schedule a follow up appointment for continuing care. The patient's family verbalized understanding of discharge instructions and prescriptions, all questions were answered. The patient's family has no further concerns at this time.          Erum Garcia RN  12/04/23 1037

## 2023-12-04 NOTE — ED TRIAGE NOTES
Patient arrives with cc of abdominal pain starting this morning. Patient's niece who assists in care taking reports she was changing his brief this morning and noticed a brown substance in his pins. She reports she does not know if it was stool or from urine. She reports for the past month he has had increase in agitation at night. Hx of kidney stones, dementia, seizures, and UTI. Patient arrives via walker, pov, and alert. Family reports it is unknown if he has vomited or had diarrhea.

## 2023-12-05 LAB
BACTERIA SPEC CULT: NORMAL
CC UR VC: NORMAL
SERVICE CMNT-IMP: NORMAL

## 2024-04-20 ENCOUNTER — APPOINTMENT (OUTPATIENT)
Facility: HOSPITAL | Age: 84
End: 2024-04-20
Payer: MEDICARE

## 2024-04-20 ENCOUNTER — HOSPITAL ENCOUNTER (EMERGENCY)
Facility: HOSPITAL | Age: 84
Discharge: HOME | End: 2024-04-20
Attending: EMERGENCY MEDICINE
Payer: MEDICARE

## 2024-04-20 VITALS
WEIGHT: 144.84 LBS | BODY MASS INDEX: 22.02 KG/M2 | OXYGEN SATURATION: 98 % | HEART RATE: 69 BPM | DIASTOLIC BLOOD PRESSURE: 73 MMHG | TEMPERATURE: 98.4 F | RESPIRATION RATE: 13 BRPM | SYSTOLIC BLOOD PRESSURE: 121 MMHG

## 2024-04-20 DIAGNOSIS — R53.1 GENERALIZED WEAKNESS: Primary | ICD-10-CM

## 2024-04-20 LAB
ALBUMIN SERPL-MCNC: 3.2 G/DL (ref 3.5–5)
ALBUMIN/GLOB SERPL: 0.8 (ref 1.1–2.2)
ALP SERPL-CCNC: 97 U/L (ref 45–117)
ALT SERPL-CCNC: <6 U/L (ref 12–78)
ANION GAP SERPL CALC-SCNC: 9 MMOL/L (ref 5–15)
APPEARANCE UR: CLEAR
AST SERPL-CCNC: 16 U/L (ref 15–37)
BACTERIA URNS QL MICRO: ABNORMAL /HPF
BASOPHILS # BLD: 0.1 K/UL (ref 0–0.1)
BASOPHILS NFR BLD: 1 % (ref 0–1)
BILIRUB SERPL-MCNC: 0.7 MG/DL (ref 0.2–1)
BILIRUB UR QL: NEGATIVE
BUN SERPL-MCNC: 24 MG/DL (ref 6–20)
BUN/CREAT SERPL: 22 (ref 12–20)
CALCIUM SERPL-MCNC: 9 MG/DL (ref 8.5–10.1)
CHLORIDE SERPL-SCNC: 105 MMOL/L (ref 97–108)
CO2 SERPL-SCNC: 29 MMOL/L (ref 21–32)
COLOR UR: ABNORMAL
CREAT SERPL-MCNC: 1.07 MG/DL (ref 0.7–1.3)
DIFFERENTIAL METHOD BLD: NORMAL
EOSINOPHIL # BLD: 0.1 K/UL (ref 0–0.4)
EOSINOPHIL NFR BLD: 2 % (ref 0–7)
EPITH CASTS URNS QL MICRO: ABNORMAL /LPF
ERYTHROCYTE [DISTWIDTH] IN BLOOD BY AUTOMATED COUNT: 13.7 % (ref 11.5–14.5)
GLOBULIN SER CALC-MCNC: 4.1 G/DL (ref 2–4)
GLUCOSE SERPL-MCNC: 135 MG/DL (ref 65–100)
GLUCOSE UR STRIP.AUTO-MCNC: NEGATIVE MG/DL
HCT VFR BLD AUTO: 44.4 % (ref 36.6–50.3)
HGB BLD-MCNC: 13.9 G/DL (ref 12.1–17)
HGB UR QL STRIP: NEGATIVE
IMM GRANULOCYTES # BLD AUTO: 0 K/UL (ref 0–0.04)
IMM GRANULOCYTES NFR BLD AUTO: 0 % (ref 0–0.5)
KETONES UR QL STRIP.AUTO: ABNORMAL MG/DL
LACTATE SERPL-SCNC: 1.2 MMOL/L (ref 0.4–2)
LEUKOCYTE ESTERASE UR QL STRIP.AUTO: ABNORMAL
LYMPHOCYTES # BLD: 1.1 K/UL (ref 0.8–3.5)
LYMPHOCYTES NFR BLD: 19 % (ref 12–49)
MCH RBC QN AUTO: 26.7 PG (ref 26–34)
MCHC RBC AUTO-ENTMCNC: 31.3 G/DL (ref 30–36.5)
MCV RBC AUTO: 85.4 FL (ref 80–99)
MONOCYTES # BLD: 0.4 K/UL (ref 0–1)
MONOCYTES NFR BLD: 7 % (ref 5–13)
NEUTS SEG # BLD: 4 K/UL (ref 1.8–8)
NEUTS SEG NFR BLD: 71 % (ref 32–75)
NITRITE UR QL STRIP.AUTO: NEGATIVE
NRBC # BLD: 0 K/UL (ref 0–0.01)
NRBC BLD-RTO: 0 PER 100 WBC
PH UR STRIP: 5.5 (ref 5–8)
PLATELET # BLD AUTO: 192 K/UL (ref 150–400)
PMV BLD AUTO: 9 FL (ref 8.9–12.9)
POTASSIUM SERPL-SCNC: 4.3 MMOL/L (ref 3.5–5.1)
PROT SERPL-MCNC: 7.3 G/DL (ref 6.4–8.2)
PROT UR STRIP-MCNC: NEGATIVE MG/DL
RBC # BLD AUTO: 5.2 M/UL (ref 4.1–5.7)
RBC #/AREA URNS HPF: ABNORMAL /HPF (ref 0–5)
SODIUM SERPL-SCNC: 143 MMOL/L (ref 136–145)
SP GR UR REFRACTOMETRY: 1.03 (ref 1–1.03)
TROPONIN I SERPL HS-MCNC: 10 NG/L (ref 0–76)
URINE CULTURE IF INDICATED: ABNORMAL
UROBILINOGEN UR QL STRIP.AUTO: 0.2 EU/DL (ref 0.2–1)
WBC # BLD AUTO: 5.7 K/UL (ref 4.1–11.1)
WBC URNS QL MICRO: ABNORMAL /HPF (ref 0–4)

## 2024-04-20 PROCEDURE — 85025 COMPLETE CBC W/AUTO DIFF WBC: CPT

## 2024-04-20 PROCEDURE — 84484 ASSAY OF TROPONIN QUANT: CPT

## 2024-04-20 PROCEDURE — 80053 COMPREHEN METABOLIC PANEL: CPT

## 2024-04-20 PROCEDURE — 81001 URINALYSIS AUTO W/SCOPE: CPT

## 2024-04-20 PROCEDURE — 87040 BLOOD CULTURE FOR BACTERIA: CPT

## 2024-04-20 PROCEDURE — 2580000003 HC RX 258: Performed by: EMERGENCY MEDICINE

## 2024-04-20 PROCEDURE — 93005 ELECTROCARDIOGRAM TRACING: CPT | Performed by: EMERGENCY MEDICINE

## 2024-04-20 PROCEDURE — 71045 X-RAY EXAM CHEST 1 VIEW: CPT

## 2024-04-20 PROCEDURE — 36415 COLL VENOUS BLD VENIPUNCTURE: CPT

## 2024-04-20 PROCEDURE — 83605 ASSAY OF LACTIC ACID: CPT

## 2024-04-20 PROCEDURE — 99285 EMERGENCY DEPT VISIT HI MDM: CPT

## 2024-04-20 PROCEDURE — 87086 URINE CULTURE/COLONY COUNT: CPT

## 2024-04-20 RX ORDER — 0.9 % SODIUM CHLORIDE 0.9 %
1000 INTRAVENOUS SOLUTION INTRAVENOUS ONCE
Status: COMPLETED | OUTPATIENT
Start: 2024-04-20 | End: 2024-04-20

## 2024-04-20 RX ADMIN — SODIUM CHLORIDE 1000 ML: 9 INJECTION, SOLUTION INTRAVENOUS at 17:23

## 2024-04-20 ASSESSMENT — PAIN - FUNCTIONAL ASSESSMENT: PAIN_FUNCTIONAL_ASSESSMENT: NONE - DENIES PAIN

## 2024-04-20 NOTE — ED PROVIDER NOTES
[facility]  EMERGENCY DEPARTMENT ENCOUNTER      Pt Name: Maverick Del Cid  MRN: [id number]  Birthdate 1940  Date of evaluation: 4/20/2024  Provider: Sami Gee MD      HISTORY OF PRESENT ILLNESS      83-year-old male with a history of Parkinson's disease presenting to the ER for fatigue.  Niece is with the patient today, she reports he has been more fatigued since last night.  He is currently being treated for a UTI and is on Macrobid.  Patient is a very limited historian, minimally verbal.  He is overall at his neurologic baseline aside of the fatigue.              Nursing Notes were reviewed.    REVIEW OF SYSTEMS         Review of Systems   Unable to perform ROS: Patient nonverbal           PAST MEDICAL HISTORY     Past Medical History:   Diagnosis Date    Chronic kidney disease     kidney stones    Ill-defined condition     parkensens disease    Other ill-defined conditions(799.89)     pt takes coumadin pt/family not sure why    Seizures (HCC)     history         SURGICAL HISTORY       Past Surgical History:   Procedure Laterality Date    COLONOSCOPY N/A 11/3/2017    COLONOSCOPY performed by Mariano Marquez MD at [facility] ENDOSCOPY    COLONOSCOPY N/A 5/16/2022    COLONOSCOPY performed by Bob Mcginnis MD at [facility] ENDOSCOPY    HEENT      cateract surgery-bilateral    OTHER SURGICAL HISTORY      kidney stones         CURRENT MEDICATIONS       Previous Medications    ASPIRIN 81 MG EC TABLET    Take 81 mg by mouth daily    CARBIDOPA-LEVODOPA (SINEMET)  MG PER TABLET    Take 1 tablet by mouth 3 times daily    CYANOCOBALAMIN 500 MCG TABLET    Take 500 mcg by mouth daily    TERAZOSIN (HYTRIN) 5 MG CAPSULE    Take 5 mg by mouth daily    VITAMIN D 25 MCG (1000 UT) CAPS    Take 1 tablet by mouth daily    WARFARIN (COUMADIN) 2.5 MG TABLET    Take 2.5 mg by mouth daily    WARFARIN (COUMADIN) 5 MG TABLET    Take 5 mg by mouth daily       ALLERGIES     Patient has no known allergies.    FAMILY

## 2024-04-20 NOTE — ED TRIAGE NOTES
ED triage note: arrives with Niece. Niece reports patient has been breathing heavy. Reports this evening when eating dinner acted like he was having trouble swallowing and was lethargic. States is on macrobid for a urinary tract infection. Not sure when was diagnosed.

## 2024-04-21 LAB
BACTERIA SPEC CULT: NORMAL
CC UR VC: NORMAL
EKG ATRIAL RATE: 81 BPM
EKG DIAGNOSIS: NORMAL
EKG P AXIS: 4 DEGREES
EKG P-R INTERVAL: 148 MS
EKG Q-T INTERVAL: 358 MS
EKG QRS DURATION: 88 MS
EKG QTC CALCULATION (BAZETT): 415 MS
EKG R AXIS: 76 DEGREES
EKG T AXIS: 57 DEGREES
EKG VENTRICULAR RATE: 81 BPM
SERVICE CMNT-IMP: NORMAL

## 2024-04-25 LAB
BACTERIA SPEC CULT: NORMAL
BACTERIA SPEC CULT: NORMAL
SERVICE CMNT-IMP: NORMAL
SERVICE CMNT-IMP: NORMAL

## 2025-05-19 ENCOUNTER — APPOINTMENT (OUTPATIENT)
Facility: HOSPITAL | Age: 85
End: 2025-05-19
Payer: MEDICARE

## 2025-05-19 ENCOUNTER — HOSPITAL ENCOUNTER (EMERGENCY)
Facility: HOSPITAL | Age: 85
Discharge: HOME | End: 2025-05-19
Attending: EMERGENCY MEDICINE
Payer: MEDICARE

## 2025-05-19 VITALS
RESPIRATION RATE: 20 BRPM | OXYGEN SATURATION: 97 % | HEART RATE: 74 BPM | HEIGHT: 68 IN | TEMPERATURE: 98.3 F | WEIGHT: 139.55 LBS | DIASTOLIC BLOOD PRESSURE: 111 MMHG | BODY MASS INDEX: 21.15 KG/M2 | SYSTOLIC BLOOD PRESSURE: 143 MMHG

## 2025-05-19 DIAGNOSIS — R56.9 SEIZURE-LIKE ACTIVITY (HCC): Primary | ICD-10-CM

## 2025-05-19 DIAGNOSIS — G20.B1 PARKINSON'S DISEASE WITH DYSKINESIA, UNSPECIFIED WHETHER MANIFESTATIONS FLUCTUATE (HCC): ICD-10-CM

## 2025-05-19 LAB
ALBUMIN SERPL-MCNC: 3.6 G/DL (ref 3.5–5)
ALBUMIN/GLOB SERPL: 0.8 (ref 1.1–2.2)
ALP SERPL-CCNC: 115 U/L (ref 45–117)
ALT SERPL-CCNC: 17 U/L (ref 12–78)
ANION GAP SERPL CALC-SCNC: 8 MMOL/L (ref 2–12)
AST SERPL-CCNC: 20 U/L (ref 15–37)
BASOPHILS # BLD: 0.06 K/UL (ref 0–0.1)
BASOPHILS NFR BLD: 0.9 % (ref 0–1)
BILIRUB SERPL-MCNC: 0.9 MG/DL (ref 0.2–1)
BUN SERPL-MCNC: 23 MG/DL (ref 6–20)
BUN/CREAT SERPL: 21 (ref 12–20)
CALCIUM SERPL-MCNC: 9.9 MG/DL (ref 8.5–10.1)
CHLORIDE SERPL-SCNC: 105 MMOL/L (ref 97–108)
CO2 SERPL-SCNC: 31 MMOL/L (ref 21–32)
COMMENT:: NORMAL
CREAT SERPL-MCNC: 1.08 MG/DL (ref 0.7–1.3)
DIFFERENTIAL METHOD BLD: NORMAL
EKG ATRIAL RATE: 82 BPM
EKG DIAGNOSIS: NORMAL
EKG P AXIS: 22 DEGREES
EKG P-R INTERVAL: 116 MS
EKG Q-T INTERVAL: 370 MS
EKG QRS DURATION: 84 MS
EKG QTC CALCULATION (BAZETT): 432 MS
EKG R AXIS: 74 DEGREES
EKG T AXIS: 59 DEGREES
EKG VENTRICULAR RATE: 82 BPM
EOSINOPHIL # BLD: 0.04 K/UL (ref 0–0.4)
EOSINOPHIL NFR BLD: 0.6 % (ref 0–7)
ERYTHROCYTE [DISTWIDTH] IN BLOOD BY AUTOMATED COUNT: 13.2 % (ref 11.5–14.5)
FLUAV RNA SPEC QL NAA+PROBE: NOT DETECTED
FLUBV RNA SPEC QL NAA+PROBE: NOT DETECTED
GLOBULIN SER CALC-MCNC: 4.6 G/DL (ref 2–4)
GLUCOSE SERPL-MCNC: 90 MG/DL (ref 65–100)
HCT VFR BLD AUTO: 45.5 % (ref 36.6–50.3)
HGB BLD-MCNC: 14.8 G/DL (ref 12.1–17)
IMM GRANULOCYTES # BLD AUTO: 0.03 K/UL (ref 0–0.04)
IMM GRANULOCYTES NFR BLD AUTO: 0.5 % (ref 0–0.5)
INR PPP: 1.1 (ref 0.9–1.1)
LACTATE BLD-SCNC: 1.82 MMOL/L (ref 0.4–2)
LYMPHOCYTES # BLD: 1.37 K/UL (ref 0.8–3.5)
LYMPHOCYTES NFR BLD: 20.6 % (ref 12–49)
MAGNESIUM SERPL-MCNC: 1.9 MG/DL (ref 1.6–2.4)
MCH RBC QN AUTO: 27.5 PG (ref 26–34)
MCHC RBC AUTO-ENTMCNC: 32.5 G/DL (ref 30–36.5)
MCV RBC AUTO: 84.4 FL (ref 80–99)
MONOCYTES # BLD: 0.4 K/UL (ref 0–1)
MONOCYTES NFR BLD: 6 % (ref 5–13)
NEUTS SEG # BLD: 4.76 K/UL (ref 1.8–8)
NEUTS SEG NFR BLD: 71.4 % (ref 32–75)
NRBC # BLD: 0 K/UL (ref 0–0.01)
NRBC BLD-RTO: 0 PER 100 WBC
PLATELET # BLD AUTO: 220 K/UL (ref 150–400)
PMV BLD AUTO: 8.9 FL (ref 8.9–12.9)
POTASSIUM SERPL-SCNC: 4.4 MMOL/L (ref 3.5–5.1)
PROT SERPL-MCNC: 8.2 G/DL (ref 6.4–8.2)
PROTHROMBIN TIME: 11.2 SEC (ref 9.2–11.2)
RBC # BLD AUTO: 5.39 M/UL (ref 4.1–5.7)
SARS-COV-2 RNA RESP QL NAA+PROBE: NOT DETECTED
SODIUM SERPL-SCNC: 144 MMOL/L (ref 136–145)
SOURCE: NORMAL
SPECIMEN HOLD: NORMAL
TROPONIN I SERPL HS-MCNC: 7 NG/L (ref 0–76)
WBC # BLD AUTO: 6.7 K/UL (ref 4.1–11.1)

## 2025-05-19 PROCEDURE — 80053 COMPREHEN METABOLIC PANEL: CPT

## 2025-05-19 PROCEDURE — 83735 ASSAY OF MAGNESIUM: CPT

## 2025-05-19 PROCEDURE — 99284 EMERGENCY DEPT VISIT MOD MDM: CPT

## 2025-05-19 PROCEDURE — 87636 SARSCOV2 & INF A&B AMP PRB: CPT

## 2025-05-19 PROCEDURE — 70450 CT HEAD/BRAIN W/O DYE: CPT

## 2025-05-19 PROCEDURE — 85025 COMPLETE CBC W/AUTO DIFF WBC: CPT

## 2025-05-19 PROCEDURE — 83605 ASSAY OF LACTIC ACID: CPT

## 2025-05-19 PROCEDURE — 93005 ELECTROCARDIOGRAM TRACING: CPT | Performed by: EMERGENCY MEDICINE

## 2025-05-19 PROCEDURE — 84484 ASSAY OF TROPONIN QUANT: CPT

## 2025-05-19 PROCEDURE — 85610 PROTHROMBIN TIME: CPT

## 2025-05-19 ASSESSMENT — PAIN DESCRIPTION - LOCATION: LOCATION: ABDOMEN

## 2025-05-19 NOTE — ED NOTES
PATIENT DISCHARGED IN STABLE CONDITION PER MD ORDER. INSTRUCTIONS, FOLLOW UP DISCUSSED WITH CAREGIVER. CAREGIVER VERBALIZED UNDERSTANDING AND HAS NO FURTHER QUESTIONS.

## 2025-05-19 NOTE — ED PROVIDER NOTES
back: Neck supple.   Skin:     General: Skin is warm and dry.   Neurological:      General: No focal deficit present.      Mental Status: He is alert. Mental status is at baseline.      Motor: Tremor (Parkinsonian tremor, reported baseline) present.      Comments: 5/5 strength in all 4 extremities with intact sensation.  Baseline mental status, known dementia, Parkinson's.             EMERGENCY DEPARTMENT COURSE and DIFFERENTIAL DIAGNOSIS/MDM:   Vitals:  There were no vitals filed for this visit.      Medical Decision Making  Amount and/or Complexity of Data Reviewed  Labs: ordered.  Radiology: ordered.  ECG/medicine tests: ordered.    84-year-old male with a history of Parkinson's and dementia presents to the ED with caregiver after he had an episode of increased shaking from his baseline tremor and possible seizure-like activity for a few seconds.  Now back to his neurologic baseline in no distress, afebrile with vital signs stable.     Rapid COVID and influenza swabs negative.  Labs returned very reassuringly showing no leukocytosis or anemia, normal lactic acid, normal electrolytes, renal function, LFTs, normal INR, negative troponin.  CT head shows no acute intracranial abnormalities does show chronic small vessel ischemic changes.    He was monitored in the ED without recurrence of symptoms.  Reassurance was given.  Recommended PCP and neurologist follow-up for further evaluation and return precautions were given for worsening or concerns.  This was discussed with the patient's caregiver at the bedside and they stated both understanding and agreement.        REASSESSMENT     ED Course as of 05/19/25 1227   Mon May 19, 2025   1153 11:51 AM EKG shows normal sinus rhythm with a rate of 82 bpm with no acute ST or T wave abnormality suggestive of ischemia. [WJ]      ED Course User Index  [WJ] Sukhjinder Burleson, DO         CONSULTS:  None    PROCEDURES:     Procedures        (Please note that portions of this note

## 2025-05-19 NOTE — ED TRIAGE NOTES
Patient arrives to triage via wheelchair for possible seizure like activity that occurred ~0945. Caregiver states it lasted a few seconds and when he came back to he was back to baseline. Caregiver states he has been having a runny nose & eye drainage. Hx of parkinsons & dementia

## 2025-06-28 ENCOUNTER — HOSPITAL ENCOUNTER (EMERGENCY)
Facility: HOSPITAL | Age: 85
Discharge: HOME | End: 2025-06-29
Attending: EMERGENCY MEDICINE
Payer: MEDICARE

## 2025-06-28 DIAGNOSIS — G20.B2 PARKINSON'S DISEASE WITH DYSKINESIA AND FLUCTUATING MANIFESTATIONS (HCC): ICD-10-CM

## 2025-06-28 DIAGNOSIS — N30.01 ACUTE CYSTITIS WITH HEMATURIA: Primary | ICD-10-CM

## 2025-06-28 LAB
ALBUMIN SERPL-MCNC: 3.4 G/DL (ref 3.5–5)
ALBUMIN/GLOB SERPL: 0.8 (ref 1.1–2.2)
ALP SERPL-CCNC: 109 U/L (ref 45–117)
ALT SERPL-CCNC: 19 U/L (ref 12–78)
ANION GAP SERPL CALC-SCNC: 5 MMOL/L (ref 2–12)
AST SERPL-CCNC: 19 U/L (ref 15–37)
BASOPHILS # BLD: 0.06 K/UL (ref 0–0.1)
BASOPHILS NFR BLD: 0.9 % (ref 0–1)
BILIRUB SERPL-MCNC: 0.4 MG/DL (ref 0.2–1)
BUN SERPL-MCNC: 22 MG/DL (ref 6–20)
BUN/CREAT SERPL: 19 (ref 12–20)
CALCIUM SERPL-MCNC: 9.2 MG/DL (ref 8.5–10.1)
CHLORIDE SERPL-SCNC: 105 MMOL/L (ref 97–108)
CO2 SERPL-SCNC: 30 MMOL/L (ref 21–32)
CREAT SERPL-MCNC: 1.15 MG/DL (ref 0.7–1.3)
DIFFERENTIAL METHOD BLD: NORMAL
EOSINOPHIL # BLD: 0.08 K/UL (ref 0–0.4)
EOSINOPHIL NFR BLD: 1.2 % (ref 0–7)
ERYTHROCYTE [DISTWIDTH] IN BLOOD BY AUTOMATED COUNT: 13.2 % (ref 11.5–14.5)
GLOBULIN SER CALC-MCNC: 4.2 G/DL (ref 2–4)
GLUCOSE BLD STRIP.AUTO-MCNC: 99 MG/DL (ref 65–117)
GLUCOSE SERPL-MCNC: 104 MG/DL (ref 65–100)
HCT VFR BLD AUTO: 42.7 % (ref 36.6–50.3)
HGB BLD-MCNC: 13.7 G/DL (ref 12.1–17)
IMM GRANULOCYTES # BLD AUTO: 0.02 K/UL (ref 0–0.04)
IMM GRANULOCYTES NFR BLD AUTO: 0.3 % (ref 0–0.5)
LIPASE SERPL-CCNC: 38 U/L (ref 13–75)
LYMPHOCYTES # BLD: 1.83 K/UL (ref 0.8–3.5)
LYMPHOCYTES NFR BLD: 27.2 % (ref 12–49)
MCH RBC QN AUTO: 27.9 PG (ref 26–34)
MCHC RBC AUTO-ENTMCNC: 32.1 G/DL (ref 30–36.5)
MCV RBC AUTO: 87 FL (ref 80–99)
MONOCYTES # BLD: 0.56 K/UL (ref 0–1)
MONOCYTES NFR BLD: 8.3 % (ref 5–13)
NEUTS SEG # BLD: 4.18 K/UL (ref 1.8–8)
NEUTS SEG NFR BLD: 62.1 % (ref 32–75)
NRBC # BLD: 0 K/UL (ref 0–0.01)
NRBC BLD-RTO: 0 PER 100 WBC
PLATELET # BLD AUTO: 187 K/UL (ref 150–400)
PMV BLD AUTO: 9.4 FL (ref 8.9–12.9)
POTASSIUM SERPL-SCNC: 3.4 MMOL/L (ref 3.5–5.1)
PROT SERPL-MCNC: 7.6 G/DL (ref 6.4–8.2)
RBC # BLD AUTO: 4.91 M/UL (ref 4.1–5.7)
SERVICE CMNT-IMP: NORMAL
SODIUM SERPL-SCNC: 140 MMOL/L (ref 136–145)
WBC # BLD AUTO: 6.7 K/UL (ref 4.1–11.1)

## 2025-06-28 PROCEDURE — 83690 ASSAY OF LIPASE: CPT

## 2025-06-28 PROCEDURE — 36415 COLL VENOUS BLD VENIPUNCTURE: CPT

## 2025-06-28 PROCEDURE — 82962 GLUCOSE BLOOD TEST: CPT

## 2025-06-28 PROCEDURE — 80053 COMPREHEN METABOLIC PANEL: CPT

## 2025-06-28 PROCEDURE — 85025 COMPLETE CBC W/AUTO DIFF WBC: CPT

## 2025-06-28 PROCEDURE — 99284 EMERGENCY DEPT VISIT MOD MDM: CPT

## 2025-06-28 ASSESSMENT — PAIN - FUNCTIONAL ASSESSMENT: PAIN_FUNCTIONAL_ASSESSMENT: NONE - DENIES PAIN

## 2025-06-29 VITALS
HEIGHT: 68 IN | BODY MASS INDEX: 21.48 KG/M2 | WEIGHT: 141.76 LBS | DIASTOLIC BLOOD PRESSURE: 81 MMHG | SYSTOLIC BLOOD PRESSURE: 127 MMHG | TEMPERATURE: 97.6 F | OXYGEN SATURATION: 93 % | HEART RATE: 74 BPM | RESPIRATION RATE: 18 BRPM

## 2025-06-29 LAB
APPEARANCE UR: CLEAR
BACTERIA URNS QL MICRO: ABNORMAL /HPF
BILIRUB UR QL: NEGATIVE
COLOR UR: ABNORMAL
EPITH CASTS URNS QL MICRO: ABNORMAL /LPF
GLUCOSE UR STRIP.AUTO-MCNC: NEGATIVE MG/DL
HGB UR QL STRIP: ABNORMAL
KETONES UR QL STRIP.AUTO: NEGATIVE MG/DL
LEUKOCYTE ESTERASE UR QL STRIP.AUTO: ABNORMAL
NITRITE UR QL STRIP.AUTO: POSITIVE
PH UR STRIP: 5.5 (ref 5–8)
PROT UR STRIP-MCNC: ABNORMAL MG/DL
RBC #/AREA URNS HPF: ABNORMAL /HPF (ref 0–5)
SP GR UR REFRACTOMETRY: 1.02 (ref 1–1.03)
UROBILINOGEN UR QL STRIP.AUTO: 0.2 EU/DL (ref 0.2–1)
WBC URNS QL MICRO: >100 /HPF (ref 0–4)

## 2025-06-29 PROCEDURE — 87088 URINE BACTERIA CULTURE: CPT

## 2025-06-29 PROCEDURE — 2580000003 HC RX 258: Performed by: EMERGENCY MEDICINE

## 2025-06-29 PROCEDURE — 87086 URINE CULTURE/COLONY COUNT: CPT

## 2025-06-29 PROCEDURE — 81001 URINALYSIS AUTO W/SCOPE: CPT

## 2025-06-29 PROCEDURE — 87186 SC STD MICRODIL/AGAR DIL: CPT

## 2025-06-29 RX ORDER — 0.9 % SODIUM CHLORIDE 0.9 %
500 INTRAVENOUS SOLUTION INTRAVENOUS ONCE
Status: COMPLETED | OUTPATIENT
Start: 2025-06-29 | End: 2025-06-29

## 2025-06-29 RX ORDER — CEPHALEXIN 500 MG/1
500 CAPSULE ORAL 3 TIMES DAILY
Qty: 21 CAPSULE | Refills: 0 | Status: SHIPPED | OUTPATIENT
Start: 2025-06-29 | End: 2025-07-01

## 2025-06-29 RX ADMIN — SODIUM CHLORIDE 500 ML: 0.9 INJECTION, SOLUTION INTRAVENOUS at 00:10

## 2025-06-29 NOTE — ED PROVIDER NOTES
distress, clear to auscultation bilaterally, normal room oxygen saturation, abdomen soft/nontender/nondistended.  Plan-CBC/CMP, UA, IV fluid hydration.        Amount and/or Complexity of Data Reviewed  Labs: ordered. Decision-making details documented in ED Course.    Risk  Prescription drug management.            REASSESSMENT     ED Course as of 06/29/25 0156   Sun Jun 29, 2025   0145 Nitrite, Urine(!): Positive [LA]   0145 Leukocyte Esterase, Urine(!): MODERATE [LA]   0145 Bacteria, UA(!): 2+ [LA]   0145 WBC, UA(!): >100 [LA]   0149 Discussed UA results with patient and family.  Family expresses understanding and reports he has a history of urinary tract infections in the past. [LA]      ED Course User Index  [LA] Kirsten Porter MD     1:59 AM  Patient's results have been reviewed with them.  Patient and/or family have verbally conveyed their understanding and agreement of the patient's signs, symptoms, diagnosis, treatment and prognosis and additionally agree to follow up as recommended or return to the Emergency Room should their condition change prior to follow-up.  Discharge instructions have also been provided to the patient with some educational information regarding their diagnosis as well a list of reasons why they would want to return to the ER prior to their follow-up appointment should their condition change.      FINAL IMPRESSION      1. Acute cystitis with hematuria    2. 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  Efforts were made to edit the dictations but occasionally words are mis-transcribed.)    Kirsten Porter MD (electronically signed)  Attending Emergency Physician           Kirsten Porter MD  06/29/25 0200

## 2025-06-29 NOTE — ED TRIAGE NOTES
[facility] Emergency Room Nursing Note        Patient Name: Maverick Del Cid      : 1940             MRN: [id number]      Chief Complaint:  Abdominal Pain, Excessive Sweating, and Difficulty Walking      Admit Diagnosis: No admission diagnoses are documented for this encounter.      Admitting Provider: No admitting provider for patient encounter.      Surgery: * No surgery found *           Patient arrived to the ER in a wheelchair from home with complaints of Sweating, and Difficulty Walking that started today per caregiver. Pt has a Hx of Parkinsons.         Lines:        Signed by: Silviano Sorenson RN, MORENA, BSN, CMSRN                                              2025 at 11:01 PM

## 2025-06-30 ENCOUNTER — RESULTS FOLLOW-UP (OUTPATIENT)
Facility: HOSPITAL | Age: 85
End: 2025-06-30

## 2025-07-01 RX ORDER — NITROFURANTOIN 25; 75 MG/1; MG/1
100 CAPSULE ORAL 2 TIMES DAILY
Qty: 20 CAPSULE | Refills: 0 | Status: SHIPPED | OUTPATIENT
Start: 2025-07-01 | End: 2025-07-11

## 2025-07-01 NOTE — ED NOTES
Received call from the lab that the urine culture grew back ESBL E. coli.  Looks like the organism is sensitive only to Macrobid and Bactrim.   On chart review patient was discharged on Keflex.  I called the patient and spoke to his POA Juliane Bender.   She says he is continuing to have generalized weakness, and they feel he has worsened since his ER visit.  Looks like he is an allergy to sulfa drugs.  Will switch him to Macrobid.  Told them to return to the ER at any time if there are concerns that he is worsening or for repeat evaluation.  She verbalized understanding.     José Antonio Vazquez MD  07/01/25 5086

## 2025-07-11 ENCOUNTER — HOSPITAL ENCOUNTER (OUTPATIENT)
Facility: HOSPITAL | Age: 85
Discharge: HOME | End: 2025-07-14
Payer: MEDICARE

## 2025-07-11 ENCOUNTER — TRANSCRIBE ORDERS (OUTPATIENT)
Facility: HOSPITAL | Age: 85
End: 2025-07-11

## 2025-07-11 DIAGNOSIS — R05.1 ACUTE COUGH: ICD-10-CM

## 2025-07-11 DIAGNOSIS — R05.1 ACUTE COUGH: Primary | ICD-10-CM

## 2025-07-11 DIAGNOSIS — R10.2 PELVIC PAIN SYNDROME: ICD-10-CM

## 2025-07-11 PROCEDURE — 72170 X-RAY EXAM OF PELVIS: CPT

## 2025-07-11 PROCEDURE — 71046 X-RAY EXAM CHEST 2 VIEWS: CPT

## 2025-07-23 ENCOUNTER — APPOINTMENT (OUTPATIENT)
Facility: HOSPITAL | Age: 85
DRG: 871 | End: 2025-07-23
Payer: MEDICARE

## 2025-07-23 ENCOUNTER — HOSPITAL ENCOUNTER (INPATIENT)
Facility: HOSPITAL | Age: 85
LOS: 13 days | Discharge: NURSING, REHAB OR LONG-TERM CARE | DRG: 871 | End: 2025-08-05
Attending: EMERGENCY MEDICINE | Admitting: INTERNAL MEDICINE
Payer: MEDICARE

## 2025-07-23 DIAGNOSIS — R31.9 URINARY TRACT INFECTION WITH HEMATURIA, SITE UNSPECIFIED: ICD-10-CM

## 2025-07-23 DIAGNOSIS — R79.89 ELEVATED LACTIC ACID LEVEL: ICD-10-CM

## 2025-07-23 DIAGNOSIS — R09.02 HYPOXIA: Primary | ICD-10-CM

## 2025-07-23 DIAGNOSIS — N39.0 URINARY TRACT INFECTION WITH HEMATURIA, SITE UNSPECIFIED: ICD-10-CM

## 2025-07-23 DIAGNOSIS — A41.9 SEPSIS, DUE TO UNSPECIFIED ORGANISM, UNSPECIFIED WHETHER ACUTE ORGAN DYSFUNCTION PRESENT (HCC): ICD-10-CM

## 2025-07-23 DIAGNOSIS — J96.01 ACUTE RESPIRATORY FAILURE WITH HYPOXIA (HCC): ICD-10-CM

## 2025-07-23 DIAGNOSIS — G93.41 ACUTE METABOLIC ENCEPHALOPATHY: ICD-10-CM

## 2025-07-23 LAB
ALBUMIN SERPL-MCNC: 3.3 G/DL (ref 3.5–5)
ALBUMIN/GLOB SERPL: 0.9 (ref 1.1–2.2)
ALP SERPL-CCNC: 98 U/L (ref 45–117)
ALT SERPL-CCNC: 10 U/L (ref 12–78)
ANION GAP SERPL CALC-SCNC: 13 MMOL/L (ref 2–12)
APPEARANCE UR: CLEAR
AST SERPL-CCNC: 23 U/L (ref 15–37)
BACTERIA URNS QL MICRO: ABNORMAL /HPF
BASOPHILS # BLD: 0.04 K/UL (ref 0–0.1)
BASOPHILS NFR BLD: 0.4 % (ref 0–1)
BILIRUB SERPL-MCNC: 0.8 MG/DL (ref 0.2–1)
BILIRUB UR QL: NEGATIVE
BUN SERPL-MCNC: 27 MG/DL (ref 6–20)
BUN/CREAT SERPL: 21 (ref 12–20)
CALCIUM SERPL-MCNC: 9.1 MG/DL (ref 8.5–10.1)
CHLORIDE SERPL-SCNC: 107 MMOL/L (ref 97–108)
CO2 SERPL-SCNC: 25 MMOL/L (ref 21–32)
COLOR UR: ABNORMAL
CREAT SERPL-MCNC: 1.3 MG/DL (ref 0.7–1.3)
DIFFERENTIAL METHOD BLD: ABNORMAL
EOSINOPHIL # BLD: 0.01 K/UL (ref 0–0.4)
EOSINOPHIL NFR BLD: 0.1 % (ref 0–7)
EPITH CASTS URNS QL MICRO: ABNORMAL /LPF
ERYTHROCYTE [DISTWIDTH] IN BLOOD BY AUTOMATED COUNT: 13.2 % (ref 11.5–14.5)
FLUAV RNA SPEC QL NAA+PROBE: NOT DETECTED
FLUBV RNA SPEC QL NAA+PROBE: NOT DETECTED
GLOBULIN SER CALC-MCNC: 3.8 G/DL (ref 2–4)
GLUCOSE BLD STRIP.AUTO-MCNC: 135 MG/DL (ref 65–117)
GLUCOSE SERPL-MCNC: 149 MG/DL (ref 65–100)
GLUCOSE UR STRIP.AUTO-MCNC: NEGATIVE MG/DL
HCT VFR BLD AUTO: 43.6 % (ref 36.6–50.3)
HGB BLD-MCNC: 14 G/DL (ref 12.1–17)
HGB UR QL STRIP: ABNORMAL
IMM GRANULOCYTES # BLD AUTO: 0.03 K/UL (ref 0–0.04)
IMM GRANULOCYTES NFR BLD AUTO: 0.3 % (ref 0–0.5)
KETONES UR QL STRIP.AUTO: NEGATIVE MG/DL
LACTATE SERPL-SCNC: 1.3 MMOL/L (ref 0.4–2)
LACTATE SERPL-SCNC: 2.1 MMOL/L (ref 0.4–2)
LACTATE SERPL-SCNC: 2.4 MMOL/L (ref 0.4–2)
LEUKOCYTE ESTERASE UR QL STRIP.AUTO: ABNORMAL
LYMPHOCYTES # BLD: 1.11 K/UL (ref 0.8–3.5)
LYMPHOCYTES NFR BLD: 10.4 % (ref 12–49)
MCH RBC QN AUTO: 27.5 PG (ref 26–34)
MCHC RBC AUTO-ENTMCNC: 32.1 G/DL (ref 30–36.5)
MCV RBC AUTO: 85.7 FL (ref 80–99)
MONOCYTES # BLD: 0.61 K/UL (ref 0–1)
MONOCYTES NFR BLD: 5.7 % (ref 5–13)
NEUTS SEG # BLD: 8.86 K/UL (ref 1.8–8)
NEUTS SEG NFR BLD: 83.1 % (ref 32–75)
NITRITE UR QL STRIP.AUTO: NEGATIVE
NRBC # BLD: 0 K/UL (ref 0–0.01)
NRBC BLD-RTO: 0 PER 100 WBC
NT PRO BNP: 2072 PG/ML (ref 0–450)
PH UR STRIP: 7 (ref 5–8)
PLATELET # BLD AUTO: 211 K/UL (ref 150–400)
PMV BLD AUTO: 9.1 FL (ref 8.9–12.9)
POTASSIUM SERPL-SCNC: 4 MMOL/L (ref 3.5–5.1)
PROT SERPL-MCNC: 7.1 G/DL (ref 6.4–8.2)
PROT UR STRIP-MCNC: 100 MG/DL
RBC # BLD AUTO: 5.09 M/UL (ref 4.1–5.7)
RBC #/AREA URNS HPF: ABNORMAL /HPF (ref 0–5)
SARS-COV-2 RNA RESP QL NAA+PROBE: NOT DETECTED
SERVICE CMNT-IMP: ABNORMAL
SODIUM SERPL-SCNC: 145 MMOL/L (ref 136–145)
SOURCE: NORMAL
SP GR UR REFRACTOMETRY: 1.02 (ref 1–1.03)
TROPONIN I SERPL HS-MCNC: 14 NG/L (ref 0–76)
UROBILINOGEN UR QL STRIP.AUTO: 0.2 EU/DL (ref 0.2–1)
WBC # BLD AUTO: 10.7 K/UL (ref 4.1–11.1)
WBC URNS QL MICRO: >100 /HPF (ref 0–4)

## 2025-07-23 PROCEDURE — 87636 SARSCOV2 & INF A&B AMP PRB: CPT

## 2025-07-23 PROCEDURE — 84484 ASSAY OF TROPONIN QUANT: CPT

## 2025-07-23 PROCEDURE — 6360000004 HC RX CONTRAST MEDICATION: Performed by: EMERGENCY MEDICINE

## 2025-07-23 PROCEDURE — 87186 SC STD MICRODIL/AGAR DIL: CPT

## 2025-07-23 PROCEDURE — 84145 PROCALCITONIN (PCT): CPT

## 2025-07-23 PROCEDURE — 2580000003 HC RX 258: Performed by: EMERGENCY MEDICINE

## 2025-07-23 PROCEDURE — 36415 COLL VENOUS BLD VENIPUNCTURE: CPT

## 2025-07-23 PROCEDURE — 87086 URINE CULTURE/COLONY COUNT: CPT

## 2025-07-23 PROCEDURE — 87040 BLOOD CULTURE FOR BACTERIA: CPT

## 2025-07-23 PROCEDURE — 81001 URINALYSIS AUTO W/SCOPE: CPT

## 2025-07-23 PROCEDURE — 6360000002 HC RX W HCPCS: Performed by: EMERGENCY MEDICINE

## 2025-07-23 PROCEDURE — 2060000000 HC ICU INTERMEDIATE R&B

## 2025-07-23 PROCEDURE — 99285 EMERGENCY DEPT VISIT HI MDM: CPT

## 2025-07-23 PROCEDURE — 71275 CT ANGIOGRAPHY CHEST: CPT

## 2025-07-23 PROCEDURE — 82962 GLUCOSE BLOOD TEST: CPT

## 2025-07-23 PROCEDURE — 96365 THER/PROPH/DIAG IV INF INIT: CPT

## 2025-07-23 PROCEDURE — 83880 ASSAY OF NATRIURETIC PEPTIDE: CPT

## 2025-07-23 PROCEDURE — 87088 URINE BACTERIA CULTURE: CPT

## 2025-07-23 PROCEDURE — 80053 COMPREHEN METABOLIC PANEL: CPT

## 2025-07-23 PROCEDURE — 85025 COMPLETE CBC W/AUTO DIFF WBC: CPT

## 2025-07-23 PROCEDURE — 70450 CT HEAD/BRAIN W/O DYE: CPT

## 2025-07-23 PROCEDURE — 93005 ELECTROCARDIOGRAM TRACING: CPT | Performed by: EMERGENCY MEDICINE

## 2025-07-23 PROCEDURE — 83605 ASSAY OF LACTIC ACID: CPT

## 2025-07-23 RX ORDER — MULTIVITAMIN WITH IRON
500 TABLET ORAL DAILY
Status: DISCONTINUED | OUTPATIENT
Start: 2025-07-24 | End: 2025-08-05 | Stop reason: HOSPADM

## 2025-07-23 RX ORDER — ONDANSETRON 4 MG/1
4 TABLET, ORALLY DISINTEGRATING ORAL EVERY 8 HOURS PRN
Status: DISCONTINUED | OUTPATIENT
Start: 2025-07-23 | End: 2025-08-05 | Stop reason: HOSPADM

## 2025-07-23 RX ORDER — IOPAMIDOL 755 MG/ML
100 INJECTION, SOLUTION INTRAVASCULAR
Status: COMPLETED | OUTPATIENT
Start: 2025-07-23 | End: 2025-07-23

## 2025-07-23 RX ORDER — ONDANSETRON 2 MG/ML
4 INJECTION INTRAMUSCULAR; INTRAVENOUS EVERY 6 HOURS PRN
Status: DISCONTINUED | OUTPATIENT
Start: 2025-07-23 | End: 2025-08-05 | Stop reason: HOSPADM

## 2025-07-23 RX ORDER — 0.9 % SODIUM CHLORIDE 0.9 %
30 INTRAVENOUS SOLUTION INTRAVENOUS ONCE
Status: DISCONTINUED | OUTPATIENT
Start: 2025-07-23 | End: 2025-07-23

## 2025-07-23 RX ORDER — 0.9 % SODIUM CHLORIDE 0.9 %
1000 INTRAVENOUS SOLUTION INTRAVENOUS ONCE
Status: COMPLETED | OUTPATIENT
Start: 2025-07-23 | End: 2025-07-23

## 2025-07-23 RX ORDER — SODIUM CHLORIDE 9 MG/ML
INJECTION, SOLUTION INTRAVENOUS PRN
Status: DISCONTINUED | OUTPATIENT
Start: 2025-07-23 | End: 2025-08-05 | Stop reason: HOSPADM

## 2025-07-23 RX ORDER — SODIUM CHLORIDE 0.9 % (FLUSH) 0.9 %
5-40 SYRINGE (ML) INJECTION EVERY 12 HOURS SCHEDULED
Status: DISCONTINUED | OUTPATIENT
Start: 2025-07-23 | End: 2025-08-05 | Stop reason: HOSPADM

## 2025-07-23 RX ORDER — ACETAMINOPHEN 325 MG/1
650 TABLET ORAL EVERY 6 HOURS PRN
Status: DISCONTINUED | OUTPATIENT
Start: 2025-07-23 | End: 2025-08-05 | Stop reason: HOSPADM

## 2025-07-23 RX ORDER — VITAMIN B COMPLEX
1000 TABLET ORAL DAILY
Status: DISCONTINUED | OUTPATIENT
Start: 2025-07-24 | End: 2025-08-05 | Stop reason: HOSPADM

## 2025-07-23 RX ORDER — ASPIRIN 81 MG/1
81 TABLET ORAL DAILY
Status: DISCONTINUED | OUTPATIENT
Start: 2025-07-24 | End: 2025-08-03

## 2025-07-23 RX ORDER — SODIUM CHLORIDE 0.9 % (FLUSH) 0.9 %
5-40 SYRINGE (ML) INJECTION PRN
Status: DISCONTINUED | OUTPATIENT
Start: 2025-07-23 | End: 2025-08-05 | Stop reason: HOSPADM

## 2025-07-23 RX ORDER — POLYETHYLENE GLYCOL 3350 17 G/17G
17 POWDER, FOR SOLUTION ORAL DAILY PRN
Status: DISCONTINUED | OUTPATIENT
Start: 2025-07-23 | End: 2025-08-05 | Stop reason: HOSPADM

## 2025-07-23 RX ORDER — CARBIDOPA/LEVODOPA 10MG-100MG
1 TABLET ORAL 3 TIMES DAILY
Status: DISCONTINUED | OUTPATIENT
Start: 2025-07-23 | End: 2025-07-24 | Stop reason: SDUPTHER

## 2025-07-23 RX ORDER — ACETAMINOPHEN 650 MG/1
650 SUPPOSITORY RECTAL EVERY 6 HOURS PRN
Status: DISCONTINUED | OUTPATIENT
Start: 2025-07-23 | End: 2025-08-05 | Stop reason: HOSPADM

## 2025-07-23 RX ADMIN — SODIUM CHLORIDE 1000 ML: 0.9 INJECTION, SOLUTION INTRAVENOUS at 16:27

## 2025-07-23 RX ADMIN — PIPERACILLIN AND TAZOBACTAM 4500 MG: 4; .5 INJECTION, POWDER, LYOPHILIZED, FOR SOLUTION INTRAVENOUS at 17:23

## 2025-07-23 RX ADMIN — IOPAMIDOL 80 ML: 755 INJECTION, SOLUTION INTRAVENOUS at 15:58

## 2025-07-24 ENCOUNTER — APPOINTMENT (OUTPATIENT)
Facility: HOSPITAL | Age: 85
DRG: 871 | End: 2025-07-24
Attending: FAMILY MEDICINE
Payer: MEDICARE

## 2025-07-24 PROBLEM — R06.02 SHORTNESS OF BREATH: Status: ACTIVE | Noted: 2025-07-24

## 2025-07-24 PROBLEM — R53.81 DEBILITY: Status: ACTIVE | Noted: 2025-07-24

## 2025-07-24 PROBLEM — R41.0 CONFUSION: Status: ACTIVE | Noted: 2025-07-24

## 2025-07-24 PROBLEM — Z51.5 PALLIATIVE CARE ENCOUNTER: Status: ACTIVE | Noted: 2025-07-24

## 2025-07-24 LAB
ALBUMIN SERPL-MCNC: 3.3 G/DL (ref 3.5–5)
ALBUMIN/GLOB SERPL: 0.9 (ref 1.1–2.2)
ALP SERPL-CCNC: 90 U/L (ref 45–117)
ALT SERPL-CCNC: 14 U/L (ref 12–78)
ANION GAP SERPL CALC-SCNC: 9 MMOL/L (ref 2–12)
APTT PPP: 26.3 SEC (ref 22.1–31)
AST SERPL-CCNC: 26 U/L (ref 15–37)
BASOPHILS # BLD: 0.03 K/UL (ref 0–0.1)
BASOPHILS NFR BLD: 0.2 % (ref 0–1)
BILIRUB SERPL-MCNC: 1.3 MG/DL (ref 0.2–1)
BUN SERPL-MCNC: 24 MG/DL (ref 6–20)
BUN/CREAT SERPL: 18 (ref 12–20)
CALCIUM SERPL-MCNC: 9.4 MG/DL (ref 8.5–10.1)
CHLORIDE SERPL-SCNC: 113 MMOL/L (ref 97–108)
CO2 SERPL-SCNC: 22 MMOL/L (ref 21–32)
CREAT SERPL-MCNC: 1.37 MG/DL (ref 0.7–1.3)
DIFFERENTIAL METHOD BLD: ABNORMAL
ECHO AO ROOT DIAM: 4.3 CM
ECHO AO ROOT INDEX: 2.51 CM/M2
ECHO AV AREA PEAK VELOCITY: 3.1 CM2
ECHO AV AREA VTI: 3.3 CM2
ECHO AV AREA/BSA PEAK VELOCITY: 1.8 CM2/M2
ECHO AV AREA/BSA VTI: 1.9 CM2/M2
ECHO AV MEAN GRADIENT: 5 MMHG
ECHO AV MEAN VELOCITY: 1.1 M/S
ECHO AV PEAK GRADIENT: 9 MMHG
ECHO AV PEAK VELOCITY: 1.5 M/S
ECHO AV VELOCITY RATIO: 0.93
ECHO AV VTI: 29.3 CM
ECHO BSA: 1.75 M2
ECHO EST RA PRESSURE: 3 MMHG
ECHO LA DIAMETER INDEX: 2.11 CM/M2
ECHO LA DIAMETER: 3.6 CM
ECHO LA TO AORTIC ROOT RATIO: 0.84
ECHO LV E' LATERAL VELOCITY: 7.63 CM/S
ECHO LV E' SEPTAL VELOCITY: 5.75 CM/S
ECHO LV EF PHYSICIAN: 60 %
ECHO LV FRACTIONAL SHORTENING: 27 % (ref 28–44)
ECHO LV GLOBAL LONGITUDINAL STRAIN (GLS): -15.9 %
ECHO LV GLOBAL LONGITUDINAL STRAIN (GLS): -17.6 %
ECHO LV GLOBAL LONGITUDINAL STRAIN (GLS): -18 %
ECHO LV GLOBAL LONGITUDINAL STRAIN (GLS): -19 %
ECHO LV INTERNAL DIMENSION DIASTOLE INDEX: 1.75 CM/M2
ECHO LV INTERNAL DIMENSION DIASTOLIC: 3 CM (ref 4.2–5.9)
ECHO LV INTERNAL DIMENSION SYSTOLIC INDEX: 1.29 CM/M2
ECHO LV INTERNAL DIMENSION SYSTOLIC: 2.2 CM
ECHO LV IVSD: 1.1 CM (ref 0.6–1)
ECHO LV MASS 2D: 102 G (ref 88–224)
ECHO LV MASS INDEX 2D: 59.7 G/M2 (ref 49–115)
ECHO LV POSTERIOR WALL DIASTOLIC: 1.2 CM (ref 0.6–1)
ECHO LV RELATIVE WALL THICKNESS RATIO: 0.8
ECHO LVOT AREA: 3.5 CM2
ECHO LVOT AV VTI INDEX: 0.94
ECHO LVOT DIAM: 2.1 CM
ECHO LVOT MEAN GRADIENT: 4 MMHG
ECHO LVOT PEAK GRADIENT: 7 MMHG
ECHO LVOT PEAK VELOCITY: 1.4 M/S
ECHO LVOT STROKE VOLUME INDEX: 55.9 ML/M2
ECHO LVOT SV: 95.5 ML
ECHO LVOT VTI: 27.6 CM
ECHO MV A VELOCITY: 1.03 M/S
ECHO MV AREA PHT: 5.4 CM2
ECHO MV E DECELERATION TIME (DT): 141.3 MS
ECHO MV E VELOCITY: 0.44 M/S
ECHO MV E/A RATIO: 0.43
ECHO MV E/E' LATERAL: 5.77
ECHO MV E/E' RATIO (AVERAGED): 6.71
ECHO MV E/E' SEPTAL: 7.65
ECHO MV PRESSURE HALF TIME (PHT): 41 MS
ECHO PV MAX VELOCITY: 0.8 M/S
ECHO PV PEAK GRADIENT: 3 MMHG
ECHO RIGHT VENTRICULAR SYSTOLIC PRESSURE (RVSP): 22 MMHG
ECHO RV TAPSE: 2.1 CM (ref 1.7–?)
ECHO TV REGURGITANT MAX VELOCITY: 2.18 M/S
ECHO TV REGURGITANT PEAK GRADIENT: 19 MMHG
EOSINOPHIL # BLD: 0.01 K/UL (ref 0–0.4)
EOSINOPHIL NFR BLD: 0.1 % (ref 0–7)
ERYTHROCYTE [DISTWIDTH] IN BLOOD BY AUTOMATED COUNT: 12.9 % (ref 11.5–14.5)
GLOBULIN SER CALC-MCNC: 3.6 G/DL (ref 2–4)
GLUCOSE SERPL-MCNC: 128 MG/DL (ref 65–100)
HCT VFR BLD AUTO: 44.6 % (ref 36.6–50.3)
HGB BLD-MCNC: 13.9 G/DL (ref 12.1–17)
IMM GRANULOCYTES # BLD AUTO: 0.05 K/UL (ref 0–0.04)
IMM GRANULOCYTES NFR BLD AUTO: 0.4 % (ref 0–0.5)
INR PPP: 1.2 (ref 0.9–1.1)
LYMPHOCYTES # BLD: 0.79 K/UL (ref 0.8–3.5)
LYMPHOCYTES NFR BLD: 5.8 % (ref 12–49)
MCH RBC QN AUTO: 27.3 PG (ref 26–34)
MCHC RBC AUTO-ENTMCNC: 31.2 G/DL (ref 30–36.5)
MCV RBC AUTO: 87.6 FL (ref 80–99)
MONOCYTES # BLD: 0.65 K/UL (ref 0–1)
MONOCYTES NFR BLD: 4.8 % (ref 5–13)
NEUTS SEG # BLD: 12.07 K/UL (ref 1.8–8)
NEUTS SEG NFR BLD: 88.7 % (ref 32–75)
NRBC # BLD: 0 K/UL (ref 0–0.01)
NRBC BLD-RTO: 0 PER 100 WBC
PLATELET # BLD AUTO: 196 K/UL (ref 150–400)
PMV BLD AUTO: 9.2 FL (ref 8.9–12.9)
POTASSIUM SERPL-SCNC: 3.8 MMOL/L (ref 3.5–5.1)
PROCALCITONIN SERPL-MCNC: 0.07 NG/ML
PROT SERPL-MCNC: 6.9 G/DL (ref 6.4–8.2)
PROTHROMBIN TIME: 12.3 SEC (ref 9.2–11.2)
RBC # BLD AUTO: 5.09 M/UL (ref 4.1–5.7)
RBC MORPH BLD: ABNORMAL
SODIUM SERPL-SCNC: 144 MMOL/L (ref 136–145)
THERAPEUTIC RANGE: NORMAL SECS (ref 58–77)
WBC # BLD AUTO: 13.6 K/UL (ref 4.1–11.1)

## 2025-07-24 PROCEDURE — 93306 TTE W/DOPPLER COMPLETE: CPT | Performed by: SPECIALIST

## 2025-07-24 PROCEDURE — 85610 PROTHROMBIN TIME: CPT

## 2025-07-24 PROCEDURE — 93306 TTE W/DOPPLER COMPLETE: CPT

## 2025-07-24 PROCEDURE — 80053 COMPREHEN METABOLIC PANEL: CPT

## 2025-07-24 PROCEDURE — 85025 COMPLETE CBC W/AUTO DIFF WBC: CPT

## 2025-07-24 PROCEDURE — 2060000000 HC ICU INTERMEDIATE R&B

## 2025-07-24 PROCEDURE — 93356 MYOCRD STRAIN IMG SPCKL TRCK: CPT | Performed by: SPECIALIST

## 2025-07-24 PROCEDURE — 6360000002 HC RX W HCPCS: Performed by: FAMILY MEDICINE

## 2025-07-24 PROCEDURE — 6370000000 HC RX 637 (ALT 250 FOR IP): Performed by: INTERNAL MEDICINE

## 2025-07-24 PROCEDURE — 99223 1ST HOSP IP/OBS HIGH 75: CPT | Performed by: PHYSICAL MEDICINE & REHABILITATION

## 2025-07-24 PROCEDURE — 97161 PT EVAL LOW COMPLEX 20 MIN: CPT

## 2025-07-24 PROCEDURE — 6370000000 HC RX 637 (ALT 250 FOR IP): Performed by: STUDENT IN AN ORGANIZED HEALTH CARE EDUCATION/TRAINING PROGRAM

## 2025-07-24 PROCEDURE — 97535 SELF CARE MNGMENT TRAINING: CPT

## 2025-07-24 PROCEDURE — 97530 THERAPEUTIC ACTIVITIES: CPT

## 2025-07-24 PROCEDURE — 2500000003 HC RX 250 WO HCPCS: Performed by: FAMILY MEDICINE

## 2025-07-24 PROCEDURE — 2580000003 HC RX 258: Performed by: FAMILY MEDICINE

## 2025-07-24 PROCEDURE — 85730 THROMBOPLASTIN TIME PARTIAL: CPT

## 2025-07-24 PROCEDURE — 6370000000 HC RX 637 (ALT 250 FOR IP): Performed by: FAMILY MEDICINE

## 2025-07-24 PROCEDURE — 2700000000 HC OXYGEN THERAPY PER DAY

## 2025-07-24 PROCEDURE — 97165 OT EVAL LOW COMPLEX 30 MIN: CPT

## 2025-07-24 PROCEDURE — 94760 N-INVAS EAR/PLS OXIMETRY 1: CPT

## 2025-07-24 RX ORDER — CARVEDILOL 3.12 MG/1
3.12 TABLET ORAL 2 TIMES DAILY WITH MEALS
Status: DISCONTINUED | OUTPATIENT
Start: 2025-07-24 | End: 2025-07-30

## 2025-07-24 RX ORDER — CARBIDOPA AND LEVODOPA 25; 100 MG/1; MG/1
2 TABLET ORAL 4 TIMES DAILY
Status: DISCONTINUED | OUTPATIENT
Start: 2025-07-24 | End: 2025-08-05 | Stop reason: HOSPADM

## 2025-07-24 RX ORDER — CARBIDOPA AND LEVODOPA 25; 100 MG/1; MG/1
2 TABLET ORAL 4 TIMES DAILY
COMMUNITY

## 2025-07-24 RX ADMIN — PIPERACILLIN AND TAZOBACTAM 3375 MG: 3; .375 INJECTION, POWDER, LYOPHILIZED, FOR SOLUTION INTRAVENOUS at 09:26

## 2025-07-24 RX ADMIN — CARVEDILOL 3.12 MG: 3.12 TABLET, FILM COATED ORAL at 17:29

## 2025-07-24 RX ADMIN — SODIUM CHLORIDE, PRESERVATIVE FREE 10 ML: 5 INJECTION INTRAVENOUS at 20:57

## 2025-07-24 RX ADMIN — ACETAMINOPHEN 650 MG: 325 TABLET ORAL at 04:56

## 2025-07-24 RX ADMIN — SODIUM CHLORIDE, PRESERVATIVE FREE 10 ML: 5 INJECTION INTRAVENOUS at 08:37

## 2025-07-24 RX ADMIN — CARBIDOPA AND LEVODOPA 2 TABLET: 25; 100 TABLET ORAL at 20:56

## 2025-07-24 RX ADMIN — CARBIDOPA AND LEVODOPA 2 TABLET: 25; 100 TABLET ORAL at 08:30

## 2025-07-24 RX ADMIN — Medication 1000 UNITS: at 08:30

## 2025-07-24 RX ADMIN — CARVEDILOL 3.12 MG: 3.12 TABLET, FILM COATED ORAL at 09:27

## 2025-07-24 RX ADMIN — CARBIDOPA AND LEVODOPA 2 TABLET: 25; 100 TABLET ORAL at 17:31

## 2025-07-24 RX ADMIN — CYANOCOBALAMIN TAB 500 MCG 500 MCG: 500 TAB at 08:30

## 2025-07-24 RX ADMIN — ASPIRIN 81 MG: 81 TABLET, COATED ORAL at 08:30

## 2025-07-24 RX ADMIN — PIPERACILLIN AND TAZOBACTAM 3375 MG: 3; .375 INJECTION, POWDER, LYOPHILIZED, FOR SOLUTION INTRAVENOUS at 17:36

## 2025-07-24 RX ADMIN — CARBIDOPA AND LEVODOPA 2 TABLET: 25; 100 TABLET ORAL at 15:23

## 2025-07-25 ENCOUNTER — APPOINTMENT (OUTPATIENT)
Facility: HOSPITAL | Age: 85
DRG: 871 | End: 2025-07-25
Payer: MEDICARE

## 2025-07-25 PROBLEM — R31.9 URINARY TRACT INFECTION WITH HEMATURIA: Status: ACTIVE | Noted: 2025-07-25

## 2025-07-25 PROBLEM — N39.0 URINARY TRACT INFECTION WITH HEMATURIA: Status: ACTIVE | Noted: 2025-07-25

## 2025-07-25 LAB
ANION GAP SERPL CALC-SCNC: 8 MMOL/L (ref 2–12)
BACTERIA SPEC CULT: ABNORMAL
BASOPHILS # BLD: 0.05 K/UL (ref 0–0.1)
BASOPHILS NFR BLD: 0.5 % (ref 0–1)
BUN SERPL-MCNC: 38 MG/DL (ref 6–20)
BUN/CREAT SERPL: 18 (ref 12–20)
CALCIUM SERPL-MCNC: 9.4 MG/DL (ref 8.5–10.1)
CC UR VC: ABNORMAL
CHLORIDE SERPL-SCNC: 114 MMOL/L (ref 97–108)
CO2 SERPL-SCNC: 23 MMOL/L (ref 21–32)
CREAT SERPL-MCNC: 2.1 MG/DL (ref 0.7–1.3)
DIFFERENTIAL METHOD BLD: ABNORMAL
EOSINOPHIL # BLD: 0.32 K/UL (ref 0–0.4)
EOSINOPHIL NFR BLD: 3 % (ref 0–7)
ERYTHROCYTE [DISTWIDTH] IN BLOOD BY AUTOMATED COUNT: 13.2 % (ref 11.5–14.5)
GLUCOSE SERPL-MCNC: 115 MG/DL (ref 65–100)
HCT VFR BLD AUTO: 44.6 % (ref 36.6–50.3)
HGB BLD-MCNC: 13.7 G/DL (ref 12.1–17)
IMM GRANULOCYTES # BLD AUTO: 0.03 K/UL (ref 0–0.04)
IMM GRANULOCYTES NFR BLD AUTO: 0.3 % (ref 0–0.5)
LYMPHOCYTES # BLD: 1.31 K/UL (ref 0.8–3.5)
LYMPHOCYTES NFR BLD: 12.4 % (ref 12–49)
MCH RBC QN AUTO: 27 PG (ref 26–34)
MCHC RBC AUTO-ENTMCNC: 30.7 G/DL (ref 30–36.5)
MCV RBC AUTO: 87.8 FL (ref 80–99)
MONOCYTES # BLD: 0.79 K/UL (ref 0–1)
MONOCYTES NFR BLD: 7.5 % (ref 5–13)
NEUTS SEG # BLD: 8.03 K/UL (ref 1.8–8)
NEUTS SEG NFR BLD: 76.3 % (ref 32–75)
NRBC # BLD: 0 K/UL (ref 0–0.01)
NRBC BLD-RTO: 0 PER 100 WBC
PLATELET # BLD AUTO: 201 K/UL (ref 150–400)
PMV BLD AUTO: 9.4 FL (ref 8.9–12.9)
POTASSIUM SERPL-SCNC: 4 MMOL/L (ref 3.5–5.1)
RBC # BLD AUTO: 5.08 M/UL (ref 4.1–5.7)
SERVICE CMNT-IMP: ABNORMAL
SODIUM SERPL-SCNC: 145 MMOL/L (ref 136–145)
WBC # BLD AUTO: 10.5 K/UL (ref 4.1–11.1)

## 2025-07-25 PROCEDURE — 99222 1ST HOSP IP/OBS MODERATE 55: CPT | Performed by: INTERNAL MEDICINE

## 2025-07-25 PROCEDURE — APPNB30 APP NON BILLABLE TIME 0-30 MINS

## 2025-07-25 PROCEDURE — 6370000000 HC RX 637 (ALT 250 FOR IP): Performed by: INTERNAL MEDICINE

## 2025-07-25 PROCEDURE — 6370000000 HC RX 637 (ALT 250 FOR IP): Performed by: FAMILY MEDICINE

## 2025-07-25 PROCEDURE — 99232 SBSQ HOSP IP/OBS MODERATE 35: CPT | Performed by: PHYSICAL MEDICINE & REHABILITATION

## 2025-07-25 PROCEDURE — 6370000000 HC RX 637 (ALT 250 FOR IP): Performed by: STUDENT IN AN ORGANIZED HEALTH CARE EDUCATION/TRAINING PROGRAM

## 2025-07-25 PROCEDURE — 2500000003 HC RX 250 WO HCPCS: Performed by: FAMILY MEDICINE

## 2025-07-25 PROCEDURE — 1110000000 HC RM PRIVATE GYN

## 2025-07-25 PROCEDURE — 94760 N-INVAS EAR/PLS OXIMETRY 1: CPT

## 2025-07-25 PROCEDURE — 2580000003 HC RX 258: Performed by: FAMILY MEDICINE

## 2025-07-25 PROCEDURE — 76770 US EXAM ABDO BACK WALL COMP: CPT

## 2025-07-25 PROCEDURE — 85025 COMPLETE CBC W/AUTO DIFF WBC: CPT

## 2025-07-25 PROCEDURE — 6360000002 HC RX W HCPCS: Performed by: FAMILY MEDICINE

## 2025-07-25 PROCEDURE — 80048 BASIC METABOLIC PNL TOTAL CA: CPT

## 2025-07-25 PROCEDURE — 2580000003 HC RX 258: Performed by: STUDENT IN AN ORGANIZED HEALTH CARE EDUCATION/TRAINING PROGRAM

## 2025-07-25 PROCEDURE — 2700000000 HC OXYGEN THERAPY PER DAY

## 2025-07-25 PROCEDURE — G0545 PR INHERENT VISIT TO INPT: HCPCS | Performed by: INTERNAL MEDICINE

## 2025-07-25 RX ORDER — SODIUM CHLORIDE 9 MG/ML
INJECTION, SOLUTION INTRAVENOUS CONTINUOUS
Status: DISCONTINUED | OUTPATIENT
Start: 2025-07-25 | End: 2025-07-27

## 2025-07-25 RX ADMIN — CARVEDILOL 3.12 MG: 3.12 TABLET, FILM COATED ORAL at 09:05

## 2025-07-25 RX ADMIN — PIPERACILLIN AND TAZOBACTAM 3375 MG: 3; .375 INJECTION, POWDER, LYOPHILIZED, FOR SOLUTION INTRAVENOUS at 09:16

## 2025-07-25 RX ADMIN — CARBIDOPA AND LEVODOPA 2 TABLET: 25; 100 TABLET ORAL at 13:36

## 2025-07-25 RX ADMIN — CARVEDILOL 3.12 MG: 3.12 TABLET, FILM COATED ORAL at 17:43

## 2025-07-25 RX ADMIN — CARBIDOPA AND LEVODOPA 2 TABLET: 25; 100 TABLET ORAL at 21:05

## 2025-07-25 RX ADMIN — PIPERACILLIN AND TAZOBACTAM 3375 MG: 3; .375 INJECTION, POWDER, LYOPHILIZED, FOR SOLUTION INTRAVENOUS at 01:31

## 2025-07-25 RX ADMIN — Medication 1000 UNITS: at 09:05

## 2025-07-25 RX ADMIN — PIPERACILLIN AND TAZOBACTAM 3375 MG: 3; .375 INJECTION, POWDER, LYOPHILIZED, FOR SOLUTION INTRAVENOUS at 17:50

## 2025-07-25 RX ADMIN — CARBIDOPA AND LEVODOPA 2 TABLET: 25; 100 TABLET ORAL at 17:35

## 2025-07-25 RX ADMIN — SODIUM CHLORIDE, PRESERVATIVE FREE 10 ML: 5 INJECTION INTRAVENOUS at 09:12

## 2025-07-25 RX ADMIN — ASPIRIN 81 MG: 81 TABLET, COATED ORAL at 09:05

## 2025-07-25 RX ADMIN — CYANOCOBALAMIN TAB 500 MCG 500 MCG: 500 TAB at 09:05

## 2025-07-25 RX ADMIN — SODIUM CHLORIDE: 0.9 INJECTION, SOLUTION INTRAVENOUS at 13:41

## 2025-07-25 RX ADMIN — CARBIDOPA AND LEVODOPA 2 TABLET: 25; 100 TABLET ORAL at 09:05

## 2025-07-26 ENCOUNTER — APPOINTMENT (OUTPATIENT)
Facility: HOSPITAL | Age: 85
DRG: 871 | End: 2025-07-26
Payer: MEDICARE

## 2025-07-26 LAB
ANION GAP SERPL CALC-SCNC: 4 MMOL/L (ref 2–12)
BASOPHILS # BLD: 0.06 K/UL (ref 0–0.1)
BASOPHILS NFR BLD: 0.6 % (ref 0–1)
BUN SERPL-MCNC: 43 MG/DL (ref 6–20)
BUN/CREAT SERPL: 21 (ref 12–20)
CALCIUM SERPL-MCNC: 8.9 MG/DL (ref 8.5–10.1)
CHLORIDE SERPL-SCNC: 118 MMOL/L (ref 97–108)
CO2 SERPL-SCNC: 25 MMOL/L (ref 21–32)
CREAT SERPL-MCNC: 2.08 MG/DL (ref 0.7–1.3)
DIFFERENTIAL METHOD BLD: NORMAL
EKG ATRIAL RATE: 86 BPM
EKG DIAGNOSIS: NORMAL
EKG P AXIS: -1 DEGREES
EKG P-R INTERVAL: 146 MS
EKG Q-T INTERVAL: 360 MS
EKG QRS DURATION: 84 MS
EKG QTC CALCULATION (BAZETT): 430 MS
EKG R AXIS: 41 DEGREES
EKG T AXIS: 27 DEGREES
EKG VENTRICULAR RATE: 86 BPM
EOSINOPHIL # BLD: 0.38 K/UL (ref 0–0.4)
EOSINOPHIL NFR BLD: 4 % (ref 0–7)
ERYTHROCYTE [DISTWIDTH] IN BLOOD BY AUTOMATED COUNT: 13 % (ref 11.5–14.5)
GLUCOSE SERPL-MCNC: 130 MG/DL (ref 65–100)
HCT VFR BLD AUTO: 44.3 % (ref 36.6–50.3)
HGB BLD-MCNC: 13.4 G/DL (ref 12.1–17)
IMM GRANULOCYTES # BLD AUTO: 0.03 K/UL (ref 0–0.04)
IMM GRANULOCYTES NFR BLD AUTO: 0.3 % (ref 0–0.5)
LYMPHOCYTES # BLD: 1.58 K/UL (ref 0.8–3.5)
LYMPHOCYTES NFR BLD: 16.4 % (ref 12–49)
MCH RBC QN AUTO: 27.2 PG (ref 26–34)
MCHC RBC AUTO-ENTMCNC: 30.2 G/DL (ref 30–36.5)
MCV RBC AUTO: 90 FL (ref 80–99)
MONOCYTES # BLD: 0.71 K/UL (ref 0–1)
MONOCYTES NFR BLD: 7.4 % (ref 5–13)
NEUTS SEG # BLD: 6.86 K/UL (ref 1.8–8)
NEUTS SEG NFR BLD: 71.3 % (ref 32–75)
NRBC # BLD: 0 K/UL (ref 0–0.01)
NRBC BLD-RTO: 0 PER 100 WBC
PLATELET # BLD AUTO: 197 K/UL (ref 150–400)
PMV BLD AUTO: 9.5 FL (ref 8.9–12.9)
POTASSIUM SERPL-SCNC: 3.8 MMOL/L (ref 3.5–5.1)
RBC # BLD AUTO: 4.92 M/UL (ref 4.1–5.7)
SODIUM SERPL-SCNC: 147 MMOL/L (ref 136–145)
WBC # BLD AUTO: 9.6 K/UL (ref 4.1–11.1)

## 2025-07-26 PROCEDURE — 6370000000 HC RX 637 (ALT 250 FOR IP): Performed by: STUDENT IN AN ORGANIZED HEALTH CARE EDUCATION/TRAINING PROGRAM

## 2025-07-26 PROCEDURE — 6360000002 HC RX W HCPCS: Performed by: FAMILY MEDICINE

## 2025-07-26 PROCEDURE — 1100000000 HC RM PRIVATE

## 2025-07-26 PROCEDURE — 94760 N-INVAS EAR/PLS OXIMETRY 1: CPT

## 2025-07-26 PROCEDURE — 2500000003 HC RX 250 WO HCPCS: Performed by: FAMILY MEDICINE

## 2025-07-26 PROCEDURE — 2580000003 HC RX 258: Performed by: STUDENT IN AN ORGANIZED HEALTH CARE EDUCATION/TRAINING PROGRAM

## 2025-07-26 PROCEDURE — 6370000000 HC RX 637 (ALT 250 FOR IP): Performed by: FAMILY MEDICINE

## 2025-07-26 PROCEDURE — 99232 SBSQ HOSP IP/OBS MODERATE 35: CPT | Performed by: INTERNAL MEDICINE

## 2025-07-26 PROCEDURE — G0545 PR INHERENT VISIT TO INPT: HCPCS | Performed by: INTERNAL MEDICINE

## 2025-07-26 PROCEDURE — 2580000003 HC RX 258: Performed by: FAMILY MEDICINE

## 2025-07-26 PROCEDURE — 74176 CT ABD & PELVIS W/O CONTRAST: CPT

## 2025-07-26 PROCEDURE — 85025 COMPLETE CBC W/AUTO DIFF WBC: CPT

## 2025-07-26 PROCEDURE — 6370000000 HC RX 637 (ALT 250 FOR IP): Performed by: INTERNAL MEDICINE

## 2025-07-26 PROCEDURE — 80048 BASIC METABOLIC PNL TOTAL CA: CPT

## 2025-07-26 PROCEDURE — 93010 ELECTROCARDIOGRAM REPORT: CPT | Performed by: SPECIALIST

## 2025-07-26 RX ADMIN — CARVEDILOL 3.12 MG: 3.12 TABLET, FILM COATED ORAL at 16:35

## 2025-07-26 RX ADMIN — PIPERACILLIN AND TAZOBACTAM 3375 MG: 3; .375 INJECTION, POWDER, LYOPHILIZED, FOR SOLUTION INTRAVENOUS at 09:04

## 2025-07-26 RX ADMIN — CARVEDILOL 3.12 MG: 3.12 TABLET, FILM COATED ORAL at 08:49

## 2025-07-26 RX ADMIN — CYANOCOBALAMIN TAB 500 MCG 500 MCG: 500 TAB at 08:49

## 2025-07-26 RX ADMIN — SODIUM CHLORIDE: 0.9 INJECTION, SOLUTION INTRAVENOUS at 16:22

## 2025-07-26 RX ADMIN — PIPERACILLIN AND TAZOBACTAM 3375 MG: 3; .375 INJECTION, POWDER, LYOPHILIZED, FOR SOLUTION INTRAVENOUS at 01:18

## 2025-07-26 RX ADMIN — CARBIDOPA AND LEVODOPA 2 TABLET: 25; 100 TABLET ORAL at 08:51

## 2025-07-26 RX ADMIN — SODIUM CHLORIDE, PRESERVATIVE FREE 10 ML: 5 INJECTION INTRAVENOUS at 08:52

## 2025-07-26 RX ADMIN — CARBIDOPA AND LEVODOPA 2 TABLET: 25; 100 TABLET ORAL at 13:31

## 2025-07-26 RX ADMIN — Medication 1000 UNITS: at 08:51

## 2025-07-26 RX ADMIN — CARBIDOPA AND LEVODOPA 2 TABLET: 25; 100 TABLET ORAL at 23:24

## 2025-07-26 RX ADMIN — CARBIDOPA AND LEVODOPA 2 TABLET: 25; 100 TABLET ORAL at 16:35

## 2025-07-26 RX ADMIN — ASPIRIN 81 MG: 81 TABLET, COATED ORAL at 08:51

## 2025-07-26 RX ADMIN — PIPERACILLIN AND TAZOBACTAM 3375 MG: 3; .375 INJECTION, POWDER, LYOPHILIZED, FOR SOLUTION INTRAVENOUS at 17:29

## 2025-07-27 ENCOUNTER — ANESTHESIA (OUTPATIENT)
Facility: HOSPITAL | Age: 85
End: 2025-07-27
Payer: MEDICARE

## 2025-07-27 ENCOUNTER — ANESTHESIA EVENT (OUTPATIENT)
Facility: HOSPITAL | Age: 85
End: 2025-07-27
Payer: MEDICARE

## 2025-07-27 LAB
ANION GAP SERPL CALC-SCNC: 7 MMOL/L (ref 2–12)
BASOPHILS # BLD: 0.05 K/UL (ref 0–0.1)
BASOPHILS NFR BLD: 0.5 % (ref 0–1)
BUN SERPL-MCNC: 52 MG/DL (ref 6–20)
BUN/CREAT SERPL: 21 (ref 12–20)
CALCIUM SERPL-MCNC: 8.7 MG/DL (ref 8.5–10.1)
CHLORIDE SERPL-SCNC: 121 MMOL/L (ref 97–108)
CO2 SERPL-SCNC: 22 MMOL/L (ref 21–32)
CREAT SERPL-MCNC: 2.45 MG/DL (ref 0.7–1.3)
DIFFERENTIAL METHOD BLD: ABNORMAL
EOSINOPHIL # BLD: 0.34 K/UL (ref 0–0.4)
EOSINOPHIL NFR BLD: 3.7 % (ref 0–7)
ERYTHROCYTE [DISTWIDTH] IN BLOOD BY AUTOMATED COUNT: 13.2 % (ref 11.5–14.5)
GLUCOSE SERPL-MCNC: 135 MG/DL (ref 65–100)
HCT VFR BLD AUTO: 39.5 % (ref 36.6–50.3)
HGB BLD-MCNC: 12.2 G/DL (ref 12.1–17)
IMM GRANULOCYTES # BLD AUTO: 0.03 K/UL (ref 0–0.04)
IMM GRANULOCYTES NFR BLD AUTO: 0.3 % (ref 0–0.5)
LYMPHOCYTES # BLD: 1.14 K/UL (ref 0.8–3.5)
LYMPHOCYTES NFR BLD: 12.3 % (ref 12–49)
MCH RBC QN AUTO: 27.5 PG (ref 26–34)
MCHC RBC AUTO-ENTMCNC: 30.9 G/DL (ref 30–36.5)
MCV RBC AUTO: 89.2 FL (ref 80–99)
MONOCYTES # BLD: 0.7 K/UL (ref 0–1)
MONOCYTES NFR BLD: 7.6 % (ref 5–13)
NEUTS SEG # BLD: 7 K/UL (ref 1.8–8)
NEUTS SEG NFR BLD: 75.6 % (ref 32–75)
NRBC # BLD: 0 K/UL (ref 0–0.01)
NRBC BLD-RTO: 0 PER 100 WBC
PLATELET # BLD AUTO: 167 K/UL (ref 150–400)
PMV BLD AUTO: 9.5 FL (ref 8.9–12.9)
POTASSIUM SERPL-SCNC: 3.9 MMOL/L (ref 3.5–5.1)
RBC # BLD AUTO: 4.43 M/UL (ref 4.1–5.7)
SODIUM SERPL-SCNC: 150 MMOL/L (ref 136–145)
WBC # BLD AUTO: 9.3 K/UL (ref 4.1–11.1)

## 2025-07-27 PROCEDURE — 6370000000 HC RX 637 (ALT 250 FOR IP): Performed by: FAMILY MEDICINE

## 2025-07-27 PROCEDURE — 1100000000 HC RM PRIVATE

## 2025-07-27 PROCEDURE — 6370000000 HC RX 637 (ALT 250 FOR IP): Performed by: STUDENT IN AN ORGANIZED HEALTH CARE EDUCATION/TRAINING PROGRAM

## 2025-07-27 PROCEDURE — 6360000002 HC RX W HCPCS: Performed by: FAMILY MEDICINE

## 2025-07-27 PROCEDURE — 51798 US URINE CAPACITY MEASURE: CPT

## 2025-07-27 PROCEDURE — 3600000012 HC SURGERY LEVEL 2 ADDTL 15MIN: Performed by: STUDENT IN AN ORGANIZED HEALTH CARE EDUCATION/TRAINING PROGRAM

## 2025-07-27 PROCEDURE — 2580000003 HC RX 258: Performed by: NURSE ANESTHETIST, CERTIFIED REGISTERED

## 2025-07-27 PROCEDURE — 3600000002 HC SURGERY LEVEL 2 BASE: Performed by: STUDENT IN AN ORGANIZED HEALTH CARE EDUCATION/TRAINING PROGRAM

## 2025-07-27 PROCEDURE — 85025 COMPLETE CBC W/AUTO DIFF WBC: CPT

## 2025-07-27 PROCEDURE — 2709999900 HC NON-CHARGEABLE SUPPLY: Performed by: STUDENT IN AN ORGANIZED HEALTH CARE EDUCATION/TRAINING PROGRAM

## 2025-07-27 PROCEDURE — C1758 CATHETER, URETERAL: HCPCS | Performed by: STUDENT IN AN ORGANIZED HEALTH CARE EDUCATION/TRAINING PROGRAM

## 2025-07-27 PROCEDURE — 6360000002 HC RX W HCPCS: Performed by: NURSE ANESTHETIST, CERTIFIED REGISTERED

## 2025-07-27 PROCEDURE — 80048 BASIC METABOLIC PNL TOTAL CA: CPT

## 2025-07-27 PROCEDURE — 2580000003 HC RX 258: Performed by: STUDENT IN AN ORGANIZED HEALTH CARE EDUCATION/TRAINING PROGRAM

## 2025-07-27 PROCEDURE — 3700000000 HC ANESTHESIA ATTENDED CARE: Performed by: STUDENT IN AN ORGANIZED HEALTH CARE EDUCATION/TRAINING PROGRAM

## 2025-07-27 PROCEDURE — 99232 SBSQ HOSP IP/OBS MODERATE 35: CPT | Performed by: INTERNAL MEDICINE

## 2025-07-27 PROCEDURE — G0545 PR INHERENT VISIT TO INPT: HCPCS | Performed by: INTERNAL MEDICINE

## 2025-07-27 PROCEDURE — 3700000001 HC ADD 15 MINUTES (ANESTHESIA): Performed by: STUDENT IN AN ORGANIZED HEALTH CARE EDUCATION/TRAINING PROGRAM

## 2025-07-27 PROCEDURE — 2500000003 HC RX 250 WO HCPCS: Performed by: FAMILY MEDICINE

## 2025-07-27 PROCEDURE — 7100000000 HC PACU RECOVERY - FIRST 15 MIN: Performed by: STUDENT IN AN ORGANIZED HEALTH CARE EDUCATION/TRAINING PROGRAM

## 2025-07-27 PROCEDURE — C1769 GUIDE WIRE: HCPCS | Performed by: STUDENT IN AN ORGANIZED HEALTH CARE EDUCATION/TRAINING PROGRAM

## 2025-07-27 PROCEDURE — 2580000003 HC RX 258: Performed by: FAMILY MEDICINE

## 2025-07-27 PROCEDURE — 7100000001 HC PACU RECOVERY - ADDTL 15 MIN: Performed by: STUDENT IN AN ORGANIZED HEALTH CARE EDUCATION/TRAINING PROGRAM

## 2025-07-27 PROCEDURE — 0TJB8ZZ INSPECTION OF BLADDER, VIA NATURAL OR ARTIFICIAL OPENING ENDOSCOPIC: ICD-10-PCS | Performed by: STUDENT IN AN ORGANIZED HEALTH CARE EDUCATION/TRAINING PROGRAM

## 2025-07-27 PROCEDURE — 6370000000 HC RX 637 (ALT 250 FOR IP): Performed by: INTERNAL MEDICINE

## 2025-07-27 RX ORDER — SODIUM CHLORIDE 9 MG/ML
INJECTION, SOLUTION INTRAVENOUS PRN
Status: DISCONTINUED | OUTPATIENT
Start: 2025-07-27 | End: 2025-07-27 | Stop reason: HOSPADM

## 2025-07-27 RX ORDER — LIDOCAINE HYDROCHLORIDE 20 MG/ML
INJECTION, SOLUTION EPIDURAL; INFILTRATION; INTRACAUDAL; PERINEURAL
Status: DISCONTINUED | OUTPATIENT
Start: 2025-07-27 | End: 2025-07-27 | Stop reason: SDUPTHER

## 2025-07-27 RX ORDER — PROPOFOL 10 MG/ML
INJECTION, EMULSION INTRAVENOUS
Status: DISCONTINUED | OUTPATIENT
Start: 2025-07-27 | End: 2025-07-27 | Stop reason: SDUPTHER

## 2025-07-27 RX ORDER — FENTANYL CITRATE 50 UG/ML
50 INJECTION, SOLUTION INTRAMUSCULAR; INTRAVENOUS EVERY 5 MIN PRN
Status: DISCONTINUED | OUTPATIENT
Start: 2025-07-27 | End: 2025-07-27 | Stop reason: HOSPADM

## 2025-07-27 RX ORDER — SODIUM CHLORIDE 0.9 % (FLUSH) 0.9 %
5-40 SYRINGE (ML) INJECTION EVERY 12 HOURS SCHEDULED
Status: DISCONTINUED | OUTPATIENT
Start: 2025-07-27 | End: 2025-07-27 | Stop reason: HOSPADM

## 2025-07-27 RX ORDER — ONDANSETRON 2 MG/ML
INJECTION INTRAMUSCULAR; INTRAVENOUS
Status: DISCONTINUED | OUTPATIENT
Start: 2025-07-27 | End: 2025-07-27 | Stop reason: SDUPTHER

## 2025-07-27 RX ORDER — 0.9 % SODIUM CHLORIDE 0.9 %
1000 INTRAVENOUS SOLUTION INTRAVENOUS ONCE
Status: COMPLETED | OUTPATIENT
Start: 2025-07-27 | End: 2025-07-27

## 2025-07-27 RX ORDER — SODIUM CHLORIDE 0.9 % (FLUSH) 0.9 %
5-40 SYRINGE (ML) INJECTION PRN
Status: DISCONTINUED | OUTPATIENT
Start: 2025-07-27 | End: 2025-07-27 | Stop reason: HOSPADM

## 2025-07-27 RX ORDER — PROCHLORPERAZINE EDISYLATE 5 MG/ML
5 INJECTION INTRAMUSCULAR; INTRAVENOUS
Status: DISCONTINUED | OUTPATIENT
Start: 2025-07-27 | End: 2025-07-27 | Stop reason: HOSPADM

## 2025-07-27 RX ORDER — DEXAMETHASONE SODIUM PHOSPHATE 4 MG/ML
INJECTION, SOLUTION INTRA-ARTICULAR; INTRALESIONAL; INTRAMUSCULAR; INTRAVENOUS; SOFT TISSUE
Status: DISCONTINUED | OUTPATIENT
Start: 2025-07-27 | End: 2025-07-27 | Stop reason: SDUPTHER

## 2025-07-27 RX ORDER — SODIUM CHLORIDE, SODIUM LACTATE, POTASSIUM CHLORIDE, CALCIUM CHLORIDE 600; 310; 30; 20 MG/100ML; MG/100ML; MG/100ML; MG/100ML
INJECTION, SOLUTION INTRAVENOUS
Status: DISCONTINUED | OUTPATIENT
Start: 2025-07-27 | End: 2025-07-27 | Stop reason: SDUPTHER

## 2025-07-27 RX ORDER — DEXTROSE MONOHYDRATE 50 MG/ML
INJECTION, SOLUTION INTRAVENOUS CONTINUOUS
Status: DISCONTINUED | OUTPATIENT
Start: 2025-07-27 | End: 2025-07-30

## 2025-07-27 RX ORDER — FENTANYL CITRATE 50 UG/ML
INJECTION, SOLUTION INTRAMUSCULAR; INTRAVENOUS
Status: DISCONTINUED | OUTPATIENT
Start: 2025-07-27 | End: 2025-07-27 | Stop reason: SDUPTHER

## 2025-07-27 RX ORDER — HYDROMORPHONE HYDROCHLORIDE 1 MG/ML
0.25 INJECTION, SOLUTION INTRAMUSCULAR; INTRAVENOUS; SUBCUTANEOUS EVERY 5 MIN PRN
Status: DISCONTINUED | OUTPATIENT
Start: 2025-07-27 | End: 2025-07-27 | Stop reason: HOSPADM

## 2025-07-27 RX ORDER — PHENYLEPHRINE HCL IN 0.9% NACL 0.4MG/10ML
SYRINGE (ML) INTRAVENOUS
Status: DISCONTINUED | OUTPATIENT
Start: 2025-07-27 | End: 2025-07-27 | Stop reason: SDUPTHER

## 2025-07-27 RX ADMIN — PIPERACILLIN AND TAZOBACTAM 3375 MG: 3; .375 INJECTION, POWDER, LYOPHILIZED, FOR SOLUTION INTRAVENOUS at 09:06

## 2025-07-27 RX ADMIN — DEXTROSE: 5 SOLUTION INTRAVENOUS at 21:15

## 2025-07-27 RX ADMIN — LIDOCAINE HYDROCHLORIDE 60 MG: 20 INJECTION, SOLUTION EPIDURAL; INFILTRATION; INTRACAUDAL; PERINEURAL at 14:05

## 2025-07-27 RX ADMIN — SODIUM CHLORIDE 1000 ML: 0.9 INJECTION, SOLUTION INTRAVENOUS at 18:42

## 2025-07-27 RX ADMIN — CARVEDILOL 3.12 MG: 3.12 TABLET, FILM COATED ORAL at 08:59

## 2025-07-27 RX ADMIN — SODIUM CHLORIDE, PRESERVATIVE FREE 10 ML: 5 INJECTION INTRAVENOUS at 08:59

## 2025-07-27 RX ADMIN — SODIUM CHLORIDE, PRESERVATIVE FREE 10 ML: 5 INJECTION INTRAVENOUS at 21:06

## 2025-07-27 RX ADMIN — Medication 80 MCG: at 14:11

## 2025-07-27 RX ADMIN — PROPOFOL 80 MG: 10 INJECTION, EMULSION INTRAVENOUS at 14:05

## 2025-07-27 RX ADMIN — FENTANYL CITRATE 25 MCG: 50 INJECTION INTRAMUSCULAR; INTRAVENOUS at 14:16

## 2025-07-27 RX ADMIN — SODIUM CHLORIDE, POTASSIUM CHLORIDE, SODIUM LACTATE AND CALCIUM CHLORIDE: 600; 310; 30; 20 INJECTION, SOLUTION INTRAVENOUS at 13:36

## 2025-07-27 RX ADMIN — DEXAMETHASONE SODIUM PHOSPHATE 4 MG: 4 INJECTION INTRA-ARTICULAR; INTRALESIONAL; INTRAMUSCULAR; INTRAVENOUS; SOFT TISSUE at 14:14

## 2025-07-27 RX ADMIN — CARBIDOPA AND LEVODOPA 2 TABLET: 25; 100 TABLET ORAL at 19:10

## 2025-07-27 RX ADMIN — PIPERACILLIN AND TAZOBACTAM 3375 MG: 3; .375 INJECTION, POWDER, LYOPHILIZED, FOR SOLUTION INTRAVENOUS at 01:06

## 2025-07-27 RX ADMIN — CARBIDOPA AND LEVODOPA 2 TABLET: 25; 100 TABLET ORAL at 08:59

## 2025-07-27 RX ADMIN — PHENYLEPHRINE HYDROCHLORIDE 60 MCG/MIN: 10 INJECTION INTRAVENOUS at 14:12

## 2025-07-27 RX ADMIN — ONDANSETRON 4 MG: 2 INJECTION, SOLUTION INTRAMUSCULAR; INTRAVENOUS at 14:51

## 2025-07-27 RX ADMIN — ASPIRIN 81 MG: 81 TABLET, COATED ORAL at 08:58

## 2025-07-27 RX ADMIN — Medication 1000 UNITS: at 08:58

## 2025-07-27 RX ADMIN — DEXTROSE: 5 SOLUTION INTRAVENOUS at 16:11

## 2025-07-27 RX ADMIN — CYANOCOBALAMIN TAB 500 MCG 500 MCG: 500 TAB at 08:59

## 2025-07-28 LAB
ANION GAP SERPL CALC-SCNC: 5 MMOL/L (ref 2–12)
BACTERIA SPEC CULT: NORMAL
BACTERIA SPEC CULT: NORMAL
BASOPHILS # BLD: 0.02 K/UL (ref 0–0.1)
BASOPHILS NFR BLD: 0.3 % (ref 0–1)
BUN SERPL-MCNC: 38 MG/DL (ref 6–20)
BUN/CREAT SERPL: 25 (ref 12–20)
CALCIUM SERPL-MCNC: 8.6 MG/DL (ref 8.5–10.1)
CHLORIDE SERPL-SCNC: 123 MMOL/L (ref 97–108)
CO2 SERPL-SCNC: 23 MMOL/L (ref 21–32)
CREAT SERPL-MCNC: 1.54 MG/DL (ref 0.7–1.3)
DIFFERENTIAL METHOD BLD: ABNORMAL
EOSINOPHIL # BLD: 0 K/UL (ref 0–0.4)
EOSINOPHIL NFR BLD: 0 % (ref 0–7)
ERYTHROCYTE [DISTWIDTH] IN BLOOD BY AUTOMATED COUNT: 13.2 % (ref 11.5–14.5)
GLUCOSE SERPL-MCNC: 137 MG/DL (ref 65–100)
HCT VFR BLD AUTO: 40.3 % (ref 36.6–50.3)
HGB BLD-MCNC: 12.4 G/DL (ref 12.1–17)
IMM GRANULOCYTES # BLD AUTO: 0.04 K/UL (ref 0–0.04)
IMM GRANULOCYTES NFR BLD AUTO: 0.5 % (ref 0–0.5)
LYMPHOCYTES # BLD: 0.9 K/UL (ref 0.8–3.5)
LYMPHOCYTES NFR BLD: 12 % (ref 12–49)
MCH RBC QN AUTO: 27.9 PG (ref 26–34)
MCHC RBC AUTO-ENTMCNC: 30.8 G/DL (ref 30–36.5)
MCV RBC AUTO: 90.6 FL (ref 80–99)
MONOCYTES # BLD: 0.45 K/UL (ref 0–1)
MONOCYTES NFR BLD: 6 % (ref 5–13)
NEUTS SEG # BLD: 6.07 K/UL (ref 1.8–8)
NEUTS SEG NFR BLD: 81.2 % (ref 32–75)
NRBC # BLD: 0 K/UL (ref 0–0.01)
NRBC BLD-RTO: 0 PER 100 WBC
PLATELET # BLD AUTO: 159 K/UL (ref 150–400)
PMV BLD AUTO: 9.3 FL (ref 8.9–12.9)
POTASSIUM SERPL-SCNC: 4.4 MMOL/L (ref 3.5–5.1)
RBC # BLD AUTO: 4.45 M/UL (ref 4.1–5.7)
SERVICE CMNT-IMP: NORMAL
SERVICE CMNT-IMP: NORMAL
SODIUM SERPL-SCNC: 151 MMOL/L (ref 136–145)
WBC # BLD AUTO: 7.5 K/UL (ref 4.1–11.1)

## 2025-07-28 PROCEDURE — 80048 BASIC METABOLIC PNL TOTAL CA: CPT

## 2025-07-28 PROCEDURE — 6370000000 HC RX 637 (ALT 250 FOR IP): Performed by: STUDENT IN AN ORGANIZED HEALTH CARE EDUCATION/TRAINING PROGRAM

## 2025-07-28 PROCEDURE — G0545 PR INHERENT VISIT TO INPT: HCPCS | Performed by: INTERNAL MEDICINE

## 2025-07-28 PROCEDURE — 6360000002 HC RX W HCPCS: Performed by: FAMILY MEDICINE

## 2025-07-28 PROCEDURE — 2580000003 HC RX 258: Performed by: FAMILY MEDICINE

## 2025-07-28 PROCEDURE — 6370000000 HC RX 637 (ALT 250 FOR IP): Performed by: FAMILY MEDICINE

## 2025-07-28 PROCEDURE — 99232 SBSQ HOSP IP/OBS MODERATE 35: CPT | Performed by: PHYSICAL MEDICINE & REHABILITATION

## 2025-07-28 PROCEDURE — 6370000000 HC RX 637 (ALT 250 FOR IP): Performed by: INTERNAL MEDICINE

## 2025-07-28 PROCEDURE — 1100000000 HC RM PRIVATE

## 2025-07-28 PROCEDURE — 85025 COMPLETE CBC W/AUTO DIFF WBC: CPT

## 2025-07-28 PROCEDURE — 99232 SBSQ HOSP IP/OBS MODERATE 35: CPT | Performed by: INTERNAL MEDICINE

## 2025-07-28 PROCEDURE — 2580000003 HC RX 258: Performed by: STUDENT IN AN ORGANIZED HEALTH CARE EDUCATION/TRAINING PROGRAM

## 2025-07-28 PROCEDURE — 6370000000 HC RX 637 (ALT 250 FOR IP): Performed by: NURSE PRACTITIONER

## 2025-07-28 PROCEDURE — 2500000003 HC RX 250 WO HCPCS: Performed by: FAMILY MEDICINE

## 2025-07-28 RX ORDER — GINSENG 100 MG
CAPSULE ORAL DAILY
Status: DISCONTINUED | OUTPATIENT
Start: 2025-07-28 | End: 2025-08-05 | Stop reason: HOSPADM

## 2025-07-28 RX ORDER — BACITRACIN ZINC 500 [USP'U]/G
OINTMENT TOPICAL DAILY
Status: DISCONTINUED | OUTPATIENT
Start: 2025-07-28 | End: 2025-07-28 | Stop reason: SDUPTHER

## 2025-07-28 RX ORDER — FINASTERIDE 5 MG/1
5 TABLET, FILM COATED ORAL DAILY
Status: DISCONTINUED | OUTPATIENT
Start: 2025-07-28 | End: 2025-08-05 | Stop reason: HOSPADM

## 2025-07-28 RX ADMIN — DEXTROSE: 5 SOLUTION INTRAVENOUS at 11:04

## 2025-07-28 RX ADMIN — PIPERACILLIN AND TAZOBACTAM 3375 MG: 3; .375 INJECTION, POWDER, LYOPHILIZED, FOR SOLUTION INTRAVENOUS at 18:43

## 2025-07-28 RX ADMIN — PIPERACILLIN AND TAZOBACTAM 3375 MG: 3; .375 INJECTION, POWDER, LYOPHILIZED, FOR SOLUTION INTRAVENOUS at 02:25

## 2025-07-28 RX ADMIN — PIPERACILLIN AND TAZOBACTAM 3375 MG: 3; .375 INJECTION, POWDER, LYOPHILIZED, FOR SOLUTION INTRAVENOUS at 11:10

## 2025-07-28 RX ADMIN — CARBIDOPA AND LEVODOPA 2 TABLET: 25; 100 TABLET ORAL at 18:43

## 2025-07-28 RX ADMIN — SODIUM CHLORIDE, PRESERVATIVE FREE 10 ML: 5 INJECTION INTRAVENOUS at 21:18

## 2025-07-28 RX ADMIN — CARBIDOPA AND LEVODOPA 2 TABLET: 25; 100 TABLET ORAL at 11:11

## 2025-07-28 RX ADMIN — DEXTROSE: 5 SOLUTION INTRAVENOUS at 18:41

## 2025-07-28 RX ADMIN — CYANOCOBALAMIN TAB 500 MCG 500 MCG: 500 TAB at 11:22

## 2025-07-28 RX ADMIN — ASPIRIN 81 MG: 81 TABLET, COATED ORAL at 11:11

## 2025-07-28 RX ADMIN — SODIUM CHLORIDE, PRESERVATIVE FREE 10 ML: 5 INJECTION INTRAVENOUS at 11:22

## 2025-07-28 RX ADMIN — CARBIDOPA AND LEVODOPA 2 TABLET: 25; 100 TABLET ORAL at 21:19

## 2025-07-28 RX ADMIN — CARVEDILOL 3.12 MG: 3.12 TABLET, FILM COATED ORAL at 11:11

## 2025-07-28 RX ADMIN — CARBIDOPA AND LEVODOPA 2 TABLET: 25; 100 TABLET ORAL at 14:48

## 2025-07-28 RX ADMIN — CARVEDILOL 3.12 MG: 3.12 TABLET, FILM COATED ORAL at 18:43

## 2025-07-28 RX ADMIN — BACITRACIN 1 PACKET: 500 OINTMENT TOPICAL at 11:22

## 2025-07-28 RX ADMIN — FINASTERIDE 5 MG: 5 TABLET, FILM COATED ORAL at 14:48

## 2025-07-28 RX ADMIN — Medication 1000 UNITS: at 11:11

## 2025-07-29 LAB
ANION GAP SERPL CALC-SCNC: 4 MMOL/L (ref 2–12)
BASOPHILS # BLD: 0.05 K/UL (ref 0–0.1)
BASOPHILS NFR BLD: 0.6 % (ref 0–1)
BUN SERPL-MCNC: 30 MG/DL (ref 6–20)
BUN/CREAT SERPL: 24 (ref 12–20)
CALCIUM SERPL-MCNC: 8.5 MG/DL (ref 8.5–10.1)
CHLORIDE SERPL-SCNC: 118 MMOL/L (ref 97–108)
CO2 SERPL-SCNC: 26 MMOL/L (ref 21–32)
CREAT SERPL-MCNC: 1.26 MG/DL (ref 0.7–1.3)
DIFFERENTIAL METHOD BLD: ABNORMAL
EOSINOPHIL # BLD: 0.28 K/UL (ref 0–0.4)
EOSINOPHIL NFR BLD: 3.5 % (ref 0–7)
ERYTHROCYTE [DISTWIDTH] IN BLOOD BY AUTOMATED COUNT: 13 % (ref 11.5–14.5)
GLUCOSE SERPL-MCNC: 118 MG/DL (ref 65–100)
HCT VFR BLD AUTO: 35.1 % (ref 36.6–50.3)
HGB BLD-MCNC: 10.8 G/DL (ref 12.1–17)
IMM GRANULOCYTES # BLD AUTO: 0.02 K/UL (ref 0–0.04)
IMM GRANULOCYTES NFR BLD AUTO: 0.3 % (ref 0–0.5)
LYMPHOCYTES # BLD: 1.38 K/UL (ref 0.8–3.5)
LYMPHOCYTES NFR BLD: 17.4 % (ref 12–49)
MCH RBC QN AUTO: 27.1 PG (ref 26–34)
MCHC RBC AUTO-ENTMCNC: 30.8 G/DL (ref 30–36.5)
MCV RBC AUTO: 88.2 FL (ref 80–99)
MONOCYTES # BLD: 0.64 K/UL (ref 0–1)
MONOCYTES NFR BLD: 8.1 % (ref 5–13)
NEUTS SEG # BLD: 5.58 K/UL (ref 1.8–8)
NEUTS SEG NFR BLD: 70.1 % (ref 32–75)
NRBC # BLD: 0 K/UL (ref 0–0.01)
NRBC BLD-RTO: 0 PER 100 WBC
PLATELET # BLD AUTO: 142 K/UL (ref 150–400)
PMV BLD AUTO: 9.5 FL (ref 8.9–12.9)
POTASSIUM SERPL-SCNC: 3.8 MMOL/L (ref 3.5–5.1)
RBC # BLD AUTO: 3.98 M/UL (ref 4.1–5.7)
SODIUM SERPL-SCNC: 148 MMOL/L (ref 136–145)
WBC # BLD AUTO: 8 K/UL (ref 4.1–11.1)

## 2025-07-29 PROCEDURE — 6370000000 HC RX 637 (ALT 250 FOR IP): Performed by: STUDENT IN AN ORGANIZED HEALTH CARE EDUCATION/TRAINING PROGRAM

## 2025-07-29 PROCEDURE — 2580000003 HC RX 258: Performed by: FAMILY MEDICINE

## 2025-07-29 PROCEDURE — 6370000000 HC RX 637 (ALT 250 FOR IP): Performed by: INTERNAL MEDICINE

## 2025-07-29 PROCEDURE — G0545 PR INHERENT VISIT TO INPT: HCPCS | Performed by: INTERNAL MEDICINE

## 2025-07-29 PROCEDURE — 6370000000 HC RX 637 (ALT 250 FOR IP): Performed by: FAMILY MEDICINE

## 2025-07-29 PROCEDURE — 97535 SELF CARE MNGMENT TRAINING: CPT

## 2025-07-29 PROCEDURE — 6370000000 HC RX 637 (ALT 250 FOR IP): Performed by: NURSE PRACTITIONER

## 2025-07-29 PROCEDURE — 99232 SBSQ HOSP IP/OBS MODERATE 35: CPT | Performed by: INTERNAL MEDICINE

## 2025-07-29 PROCEDURE — 97116 GAIT TRAINING THERAPY: CPT

## 2025-07-29 PROCEDURE — 99497 ADVNCD CARE PLAN 30 MIN: CPT | Performed by: PHYSICAL MEDICINE & REHABILITATION

## 2025-07-29 PROCEDURE — 97530 THERAPEUTIC ACTIVITIES: CPT

## 2025-07-29 PROCEDURE — 1100000000 HC RM PRIVATE

## 2025-07-29 PROCEDURE — 2500000003 HC RX 250 WO HCPCS: Performed by: STUDENT IN AN ORGANIZED HEALTH CARE EDUCATION/TRAINING PROGRAM

## 2025-07-29 PROCEDURE — 85025 COMPLETE CBC W/AUTO DIFF WBC: CPT

## 2025-07-29 PROCEDURE — 2500000003 HC RX 250 WO HCPCS: Performed by: FAMILY MEDICINE

## 2025-07-29 PROCEDURE — 05HA33Z INSERTION OF INFUSION DEVICE INTO LEFT BRACHIAL VEIN, PERCUTANEOUS APPROACH: ICD-10-PCS | Performed by: STUDENT IN AN ORGANIZED HEALTH CARE EDUCATION/TRAINING PROGRAM

## 2025-07-29 PROCEDURE — 6360000002 HC RX W HCPCS: Performed by: FAMILY MEDICINE

## 2025-07-29 PROCEDURE — 2580000003 HC RX 258: Performed by: STUDENT IN AN ORGANIZED HEALTH CARE EDUCATION/TRAINING PROGRAM

## 2025-07-29 PROCEDURE — 80048 BASIC METABOLIC PNL TOTAL CA: CPT

## 2025-07-29 PROCEDURE — 36410 VNPNXR 3YR/> PHY/QHP DX/THER: CPT

## 2025-07-29 RX ORDER — LIDOCAINE HYDROCHLORIDE 10 MG/ML
50 INJECTION, SOLUTION EPIDURAL; INFILTRATION; INTRACAUDAL; PERINEURAL ONCE
Status: DISCONTINUED | OUTPATIENT
Start: 2025-07-29 | End: 2025-08-05 | Stop reason: HOSPADM

## 2025-07-29 RX ORDER — SODIUM CHLORIDE 0.9 % (FLUSH) 0.9 %
5-40 SYRINGE (ML) INJECTION PRN
Status: DISCONTINUED | OUTPATIENT
Start: 2025-07-29 | End: 2025-08-05 | Stop reason: HOSPADM

## 2025-07-29 RX ORDER — SODIUM CHLORIDE 0.9 % (FLUSH) 0.9 %
5-40 SYRINGE (ML) INJECTION EVERY 12 HOURS SCHEDULED
Status: DISCONTINUED | OUTPATIENT
Start: 2025-07-29 | End: 2025-08-05 | Stop reason: HOSPADM

## 2025-07-29 RX ORDER — SODIUM CHLORIDE 9 MG/ML
INJECTION, SOLUTION INTRAVENOUS PRN
Status: DISCONTINUED | OUTPATIENT
Start: 2025-07-29 | End: 2025-08-05 | Stop reason: HOSPADM

## 2025-07-29 RX ADMIN — SODIUM CHLORIDE, PRESERVATIVE FREE 10 ML: 5 INJECTION INTRAVENOUS at 21:31

## 2025-07-29 RX ADMIN — BACITRACIN 1 PACKET: 500 OINTMENT TOPICAL at 11:50

## 2025-07-29 RX ADMIN — PIPERACILLIN AND TAZOBACTAM 3375 MG: 3; .375 INJECTION, POWDER, LYOPHILIZED, FOR SOLUTION INTRAVENOUS at 17:28

## 2025-07-29 RX ADMIN — CARBIDOPA AND LEVODOPA 2 TABLET: 25; 100 TABLET ORAL at 11:50

## 2025-07-29 RX ADMIN — FINASTERIDE 5 MG: 5 TABLET, FILM COATED ORAL at 11:50

## 2025-07-29 RX ADMIN — Medication 1000 UNITS: at 11:50

## 2025-07-29 RX ADMIN — DEXTROSE: 5 SOLUTION INTRAVENOUS at 17:07

## 2025-07-29 RX ADMIN — PIPERACILLIN AND TAZOBACTAM 3375 MG: 3; .375 INJECTION, POWDER, LYOPHILIZED, FOR SOLUTION INTRAVENOUS at 01:05

## 2025-07-29 RX ADMIN — DEXTROSE: 5 SOLUTION INTRAVENOUS at 04:42

## 2025-07-29 RX ADMIN — CARBIDOPA AND LEVODOPA 2 TABLET: 25; 100 TABLET ORAL at 14:25

## 2025-07-29 RX ADMIN — CYANOCOBALAMIN TAB 500 MCG 500 MCG: 500 TAB at 11:50

## 2025-07-29 RX ADMIN — CARBIDOPA AND LEVODOPA 2 TABLET: 25; 100 TABLET ORAL at 17:08

## 2025-07-29 RX ADMIN — SODIUM CHLORIDE, PRESERVATIVE FREE 10 ML: 5 INJECTION INTRAVENOUS at 21:32

## 2025-07-29 RX ADMIN — PIPERACILLIN AND TAZOBACTAM 3375 MG: 3; .375 INJECTION, POWDER, LYOPHILIZED, FOR SOLUTION INTRAVENOUS at 11:48

## 2025-07-29 RX ADMIN — CARBIDOPA AND LEVODOPA 2 TABLET: 25; 100 TABLET ORAL at 21:30

## 2025-07-29 RX ADMIN — ASPIRIN 81 MG: 81 TABLET, COATED ORAL at 11:50

## 2025-07-29 RX ADMIN — CARVEDILOL 3.12 MG: 3.12 TABLET, FILM COATED ORAL at 17:08

## 2025-07-29 RX ADMIN — CARVEDILOL 3.12 MG: 3.12 TABLET, FILM COATED ORAL at 11:50

## 2025-07-30 LAB
ABO + RH BLD: NORMAL
ANION GAP SERPL CALC-SCNC: 8 MMOL/L (ref 2–14)
ANION GAP SERPL CALC-SCNC: 9 MMOL/L (ref 2–14)
BASOPHILS # BLD: 0.04 K/UL (ref 0–0.1)
BASOPHILS # BLD: 0.05 K/UL (ref 0–0.1)
BASOPHILS NFR BLD: 0.6 % (ref 0–1)
BASOPHILS NFR BLD: 0.7 % (ref 0–1)
BLOOD GROUP ANTIBODIES SERPL: NORMAL
BUN SERPL-MCNC: 19 MG/DL (ref 8–23)
BUN SERPL-MCNC: 21 MG/DL (ref 8–23)
BUN/CREAT SERPL: 18 (ref 12–20)
BUN/CREAT SERPL: 19 (ref 12–20)
CALCIUM SERPL-MCNC: 8.2 MG/DL (ref 8.8–10.2)
CALCIUM SERPL-MCNC: 8.5 MG/DL (ref 8.8–10.2)
CHLORIDE SERPL-SCNC: 109 MMOL/L (ref 98–107)
CHLORIDE SERPL-SCNC: 112 MMOL/L (ref 98–107)
CO2 SERPL-SCNC: 23 MMOL/L (ref 20–29)
CO2 SERPL-SCNC: 25 MMOL/L (ref 20–29)
COMMENT:: NORMAL
CREAT SERPL-MCNC: 1.1 MG/DL (ref 0.7–1.2)
CREAT SERPL-MCNC: 1.12 MG/DL (ref 0.7–1.2)
DIFFERENTIAL METHOD BLD: ABNORMAL
DIFFERENTIAL METHOD BLD: ABNORMAL
EOSINOPHIL # BLD: 0.25 K/UL (ref 0–0.4)
EOSINOPHIL # BLD: 0.34 K/UL (ref 0–0.4)
EOSINOPHIL NFR BLD: 3.7 % (ref 0–7)
EOSINOPHIL NFR BLD: 5 % (ref 0–7)
ERYTHROCYTE [DISTWIDTH] IN BLOOD BY AUTOMATED COUNT: 12.6 % (ref 11.5–14.5)
ERYTHROCYTE [DISTWIDTH] IN BLOOD BY AUTOMATED COUNT: 12.8 % (ref 11.5–14.5)
GLUCOSE BLD STRIP.AUTO-MCNC: 158 MG/DL (ref 65–117)
GLUCOSE SERPL-MCNC: 102 MG/DL (ref 65–100)
GLUCOSE SERPL-MCNC: 114 MG/DL (ref 65–100)
HCT VFR BLD AUTO: 34.6 % (ref 36.6–50.3)
HCT VFR BLD AUTO: 35.3 % (ref 36.6–50.3)
HGB BLD-MCNC: 10.4 G/DL (ref 12.1–17)
HGB BLD-MCNC: 11 G/DL (ref 12.1–17)
IMM GRANULOCYTES # BLD AUTO: 0.02 K/UL (ref 0–0.04)
IMM GRANULOCYTES # BLD AUTO: 0.04 K/UL (ref 0–0.04)
IMM GRANULOCYTES NFR BLD AUTO: 0.3 % (ref 0–0.5)
IMM GRANULOCYTES NFR BLD AUTO: 0.6 % (ref 0–0.5)
LACTATE SERPL-SCNC: 1.7 MMOL/L (ref 0.5–2)
LYMPHOCYTES # BLD: 1.34 K/UL (ref 0.8–3.5)
LYMPHOCYTES # BLD: 1.63 K/UL (ref 0.8–3.5)
LYMPHOCYTES NFR BLD: 19.6 % (ref 12–49)
LYMPHOCYTES NFR BLD: 23.9 % (ref 12–49)
MCH RBC QN AUTO: 27.2 PG (ref 26–34)
MCH RBC QN AUTO: 27.3 PG (ref 26–34)
MCHC RBC AUTO-ENTMCNC: 30.1 G/DL (ref 30–36.5)
MCHC RBC AUTO-ENTMCNC: 31.2 G/DL (ref 30–36.5)
MCV RBC AUTO: 87.4 FL (ref 80–99)
MCV RBC AUTO: 90.8 FL (ref 80–99)
MONOCYTES # BLD: 0.68 K/UL (ref 0–1)
MONOCYTES # BLD: 0.72 K/UL (ref 0–1)
MONOCYTES NFR BLD: 10 % (ref 5–13)
MONOCYTES NFR BLD: 10.5 % (ref 5–13)
NEUTS SEG # BLD: 4.1 K/UL (ref 1.8–8)
NEUTS SEG # BLD: 4.44 K/UL (ref 1.8–8)
NEUTS SEG NFR BLD: 60.1 % (ref 32–75)
NEUTS SEG NFR BLD: 65 % (ref 32–75)
NRBC # BLD: 0 K/UL (ref 0–0.01)
NRBC # BLD: 0 K/UL (ref 0–0.01)
NRBC BLD-RTO: 0 PER 100 WBC
NRBC BLD-RTO: 0 PER 100 WBC
PLATELET # BLD AUTO: 122 K/UL (ref 150–400)
PLATELET # BLD AUTO: 134 K/UL (ref 150–400)
PMV BLD AUTO: 10 FL (ref 8.9–12.9)
PMV BLD AUTO: 10.4 FL (ref 8.9–12.9)
POTASSIUM SERPL-SCNC: 3.4 MMOL/L (ref 3.5–5.1)
POTASSIUM SERPL-SCNC: 3.6 MMOL/L (ref 3.5–5.1)
RBC # BLD AUTO: 3.81 M/UL (ref 4.1–5.7)
RBC # BLD AUTO: 4.04 M/UL (ref 4.1–5.7)
SERVICE CMNT-IMP: ABNORMAL
SODIUM SERPL-SCNC: 143 MMOL/L (ref 136–145)
SODIUM SERPL-SCNC: 143 MMOL/L (ref 136–145)
SPECIMEN EXP DATE BLD: NORMAL
SPECIMEN HOLD: NORMAL
WBC # BLD AUTO: 6.8 K/UL (ref 4.1–11.1)
WBC # BLD AUTO: 6.8 K/UL (ref 4.1–11.1)

## 2025-07-30 PROCEDURE — 80048 BASIC METABOLIC PNL TOTAL CA: CPT

## 2025-07-30 PROCEDURE — 6370000000 HC RX 637 (ALT 250 FOR IP): Performed by: NURSE PRACTITIONER

## 2025-07-30 PROCEDURE — 86850 RBC ANTIBODY SCREEN: CPT

## 2025-07-30 PROCEDURE — 83605 ASSAY OF LACTIC ACID: CPT

## 2025-07-30 PROCEDURE — 2580000003 HC RX 258: Performed by: STUDENT IN AN ORGANIZED HEALTH CARE EDUCATION/TRAINING PROGRAM

## 2025-07-30 PROCEDURE — 86900 BLOOD TYPING SEROLOGIC ABO: CPT

## 2025-07-30 PROCEDURE — 2580000003 HC RX 258: Performed by: FAMILY MEDICINE

## 2025-07-30 PROCEDURE — 6370000000 HC RX 637 (ALT 250 FOR IP): Performed by: INTERNAL MEDICINE

## 2025-07-30 PROCEDURE — 2500000003 HC RX 250 WO HCPCS: Performed by: STUDENT IN AN ORGANIZED HEALTH CARE EDUCATION/TRAINING PROGRAM

## 2025-07-30 PROCEDURE — 97535 SELF CARE MNGMENT TRAINING: CPT

## 2025-07-30 PROCEDURE — 6370000000 HC RX 637 (ALT 250 FOR IP): Performed by: STUDENT IN AN ORGANIZED HEALTH CARE EDUCATION/TRAINING PROGRAM

## 2025-07-30 PROCEDURE — 6360000002 HC RX W HCPCS: Performed by: INTERNAL MEDICINE

## 2025-07-30 PROCEDURE — 2580000003 HC RX 258: Performed by: INTERNAL MEDICINE

## 2025-07-30 PROCEDURE — 86901 BLOOD TYPING SEROLOGIC RH(D): CPT

## 2025-07-30 PROCEDURE — 82962 GLUCOSE BLOOD TEST: CPT

## 2025-07-30 PROCEDURE — 85025 COMPLETE CBC W/AUTO DIFF WBC: CPT

## 2025-07-30 PROCEDURE — 1100000000 HC RM PRIVATE

## 2025-07-30 PROCEDURE — 2500000003 HC RX 250 WO HCPCS: Performed by: FAMILY MEDICINE

## 2025-07-30 PROCEDURE — 6370000000 HC RX 637 (ALT 250 FOR IP): Performed by: FAMILY MEDICINE

## 2025-07-30 PROCEDURE — 6360000002 HC RX W HCPCS: Performed by: FAMILY MEDICINE

## 2025-07-30 RX ORDER — CARVEDILOL 3.12 MG/1
3.12 TABLET ORAL 2 TIMES DAILY WITH MEALS
Qty: 60 TABLET | Refills: 0 | Status: SHIPPED | OUTPATIENT
Start: 2025-07-30 | End: 2025-08-29

## 2025-07-30 RX ORDER — 0.9 % SODIUM CHLORIDE 0.9 %
1000 INTRAVENOUS SOLUTION INTRAVENOUS ONCE
Status: DISCONTINUED | OUTPATIENT
Start: 2025-07-30 | End: 2025-07-30

## 2025-07-30 RX ORDER — FINASTERIDE 5 MG/1
5 TABLET, FILM COATED ORAL DAILY
Qty: 30 TABLET | Refills: 0 | Status: SHIPPED | OUTPATIENT
Start: 2025-07-30 | End: 2025-08-29

## 2025-07-30 RX ORDER — DEXTROSE MONOHYDRATE 50 MG/ML
INJECTION, SOLUTION INTRAVENOUS CONTINUOUS
Status: DISPENSED | OUTPATIENT
Start: 2025-07-30 | End: 2025-07-30

## 2025-07-30 RX ORDER — MIDODRINE HYDROCHLORIDE 5 MG/1
10 TABLET ORAL ONCE
Status: COMPLETED | OUTPATIENT
Start: 2025-07-30 | End: 2025-07-30

## 2025-07-30 RX ORDER — GINSENG 100 MG
CAPSULE ORAL
Qty: 1 EACH | Refills: 0 | Status: SHIPPED | OUTPATIENT
Start: 2025-07-30 | End: 2025-08-09

## 2025-07-30 RX ORDER — 0.9 % SODIUM CHLORIDE 0.9 %
1000 INTRAVENOUS SOLUTION INTRAVENOUS ONCE
Status: COMPLETED | OUTPATIENT
Start: 2025-07-30 | End: 2025-07-30

## 2025-07-30 RX ADMIN — CARBIDOPA AND LEVODOPA 2 TABLET: 25; 100 TABLET ORAL at 17:57

## 2025-07-30 RX ADMIN — SODIUM CHLORIDE, PRESERVATIVE FREE 10 ML: 5 INJECTION INTRAVENOUS at 20:38

## 2025-07-30 RX ADMIN — MIDODRINE HYDROCHLORIDE 10 MG: 5 TABLET ORAL at 13:04

## 2025-07-30 RX ADMIN — SODIUM CHLORIDE, PRESERVATIVE FREE 10 ML: 5 INJECTION INTRAVENOUS at 09:04

## 2025-07-30 RX ADMIN — CARBIDOPA AND LEVODOPA 2 TABLET: 25; 100 TABLET ORAL at 09:02

## 2025-07-30 RX ADMIN — SODIUM CHLORIDE 1000 ML: 0.9 INJECTION, SOLUTION INTRAVENOUS at 11:53

## 2025-07-30 RX ADMIN — DEXTROSE: 5 SOLUTION INTRAVENOUS at 03:04

## 2025-07-30 RX ADMIN — CARVEDILOL 3.12 MG: 3.12 TABLET, FILM COATED ORAL at 09:01

## 2025-07-30 RX ADMIN — CYANOCOBALAMIN TAB 500 MCG 500 MCG: 500 TAB at 09:02

## 2025-07-30 RX ADMIN — BACITRACIN 1 PACKET: 500 OINTMENT TOPICAL at 09:04

## 2025-07-30 RX ADMIN — PIPERACILLIN AND TAZOBACTAM 3375 MG: 3; .375 INJECTION, POWDER, LYOPHILIZED, FOR SOLUTION INTRAVENOUS at 00:58

## 2025-07-30 RX ADMIN — PIPERACILLIN AND TAZOBACTAM 3375 MG: 3; .375 INJECTION, POWDER, FOR SOLUTION INTRAVENOUS at 20:40

## 2025-07-30 RX ADMIN — CARBIDOPA AND LEVODOPA 2 TABLET: 25; 100 TABLET ORAL at 20:38

## 2025-07-30 RX ADMIN — ASPIRIN 81 MG: 81 TABLET, COATED ORAL at 09:02

## 2025-07-30 RX ADMIN — FINASTERIDE 5 MG: 5 TABLET, FILM COATED ORAL at 09:03

## 2025-07-30 RX ADMIN — Medication 1000 UNITS: at 09:02

## 2025-07-30 RX ADMIN — PIPERACILLIN AND TAZOBACTAM 3375 MG: 3; .375 INJECTION, POWDER, FOR SOLUTION INTRAVENOUS at 13:09

## 2025-07-30 RX ADMIN — CARBIDOPA AND LEVODOPA 2 TABLET: 25; 100 TABLET ORAL at 13:04

## 2025-07-31 LAB
ANION GAP SERPL CALC-SCNC: 9 MMOL/L (ref 2–14)
BASOPHILS # BLD: 0.07 K/UL (ref 0–0.1)
BASOPHILS NFR BLD: 1 % (ref 0–1)
BUN SERPL-MCNC: 17 MG/DL (ref 8–23)
BUN/CREAT SERPL: 16 (ref 12–20)
CALCIUM SERPL-MCNC: 8.7 MG/DL (ref 8.8–10.2)
CHLORIDE SERPL-SCNC: 109 MMOL/L (ref 98–107)
CO2 SERPL-SCNC: 24 MMOL/L (ref 20–29)
CREAT SERPL-MCNC: 1.08 MG/DL (ref 0.7–1.2)
DIFFERENTIAL METHOD BLD: ABNORMAL
EOSINOPHIL # BLD: 0.25 K/UL (ref 0–0.4)
EOSINOPHIL NFR BLD: 3.4 % (ref 0–7)
ERYTHROCYTE [DISTWIDTH] IN BLOOD BY AUTOMATED COUNT: 12.7 % (ref 11.5–14.5)
GLUCOSE SERPL-MCNC: 104 MG/DL (ref 65–100)
HCT VFR BLD AUTO: 35.6 % (ref 36.6–50.3)
HGB BLD-MCNC: 11.1 G/DL (ref 12.1–17)
IMM GRANULOCYTES # BLD AUTO: 0.03 K/UL (ref 0–0.04)
IMM GRANULOCYTES NFR BLD AUTO: 0.4 % (ref 0–0.5)
LYMPHOCYTES # BLD: 1.38 K/UL (ref 0.8–3.5)
LYMPHOCYTES NFR BLD: 19 % (ref 12–49)
MCH RBC QN AUTO: 27.3 PG (ref 26–34)
MCHC RBC AUTO-ENTMCNC: 31.2 G/DL (ref 30–36.5)
MCV RBC AUTO: 87.5 FL (ref 80–99)
MONOCYTES # BLD: 0.67 K/UL (ref 0–1)
MONOCYTES NFR BLD: 9.2 % (ref 5–13)
NEUTS SEG # BLD: 4.85 K/UL (ref 1.8–8)
NEUTS SEG NFR BLD: 67 % (ref 32–75)
NRBC # BLD: 0 K/UL (ref 0–0.01)
NRBC BLD-RTO: 0 PER 100 WBC
PLATELET # BLD AUTO: 140 K/UL (ref 150–400)
PMV BLD AUTO: 9.7 FL (ref 8.9–12.9)
POTASSIUM SERPL-SCNC: 3.8 MMOL/L (ref 3.5–5.1)
RBC # BLD AUTO: 4.07 M/UL (ref 4.1–5.7)
SODIUM SERPL-SCNC: 143 MMOL/L (ref 136–145)
WBC # BLD AUTO: 7.3 K/UL (ref 4.1–11.1)

## 2025-07-31 PROCEDURE — 80048 BASIC METABOLIC PNL TOTAL CA: CPT

## 2025-07-31 PROCEDURE — 1100000000 HC RM PRIVATE

## 2025-07-31 PROCEDURE — 2580000003 HC RX 258: Performed by: INTERNAL MEDICINE

## 2025-07-31 PROCEDURE — 6360000002 HC RX W HCPCS: Performed by: INTERNAL MEDICINE

## 2025-07-31 PROCEDURE — 6370000000 HC RX 637 (ALT 250 FOR IP): Performed by: INTERNAL MEDICINE

## 2025-07-31 PROCEDURE — 85025 COMPLETE CBC W/AUTO DIFF WBC: CPT

## 2025-07-31 PROCEDURE — 6370000000 HC RX 637 (ALT 250 FOR IP): Performed by: FAMILY MEDICINE

## 2025-07-31 PROCEDURE — 2500000003 HC RX 250 WO HCPCS: Performed by: STUDENT IN AN ORGANIZED HEALTH CARE EDUCATION/TRAINING PROGRAM

## 2025-07-31 PROCEDURE — 6370000000 HC RX 637 (ALT 250 FOR IP): Performed by: NURSE PRACTITIONER

## 2025-07-31 PROCEDURE — 6370000000 HC RX 637 (ALT 250 FOR IP): Performed by: STUDENT IN AN ORGANIZED HEALTH CARE EDUCATION/TRAINING PROGRAM

## 2025-07-31 PROCEDURE — 2500000003 HC RX 250 WO HCPCS: Performed by: FAMILY MEDICINE

## 2025-07-31 RX ADMIN — SODIUM CHLORIDE, PRESERVATIVE FREE 10 ML: 5 INJECTION INTRAVENOUS at 21:29

## 2025-07-31 RX ADMIN — Medication 1 EACH: at 13:25

## 2025-07-31 RX ADMIN — Medication 1000 UNITS: at 10:14

## 2025-07-31 RX ADMIN — CARBIDOPA AND LEVODOPA 2 TABLET: 25; 100 TABLET ORAL at 17:41

## 2025-07-31 RX ADMIN — CARBIDOPA AND LEVODOPA 2 TABLET: 25; 100 TABLET ORAL at 21:30

## 2025-07-31 RX ADMIN — PIPERACILLIN AND TAZOBACTAM 3375 MG: 3; .375 INJECTION, POWDER, FOR SOLUTION INTRAVENOUS at 14:26

## 2025-07-31 RX ADMIN — ASPIRIN 81 MG: 81 TABLET, COATED ORAL at 10:14

## 2025-07-31 RX ADMIN — SODIUM CHLORIDE, PRESERVATIVE FREE 10 ML: 5 INJECTION INTRAVENOUS at 10:15

## 2025-07-31 RX ADMIN — CYANOCOBALAMIN TAB 500 MCG 500 MCG: 500 TAB at 10:18

## 2025-07-31 RX ADMIN — PIPERACILLIN AND TAZOBACTAM 3375 MG: 3; .375 INJECTION, POWDER, FOR SOLUTION INTRAVENOUS at 21:24

## 2025-07-31 RX ADMIN — CARBIDOPA AND LEVODOPA 2 TABLET: 25; 100 TABLET ORAL at 14:26

## 2025-07-31 RX ADMIN — FINASTERIDE 5 MG: 5 TABLET, FILM COATED ORAL at 10:14

## 2025-07-31 RX ADMIN — BACITRACIN 1 PACKET: 500 OINTMENT TOPICAL at 10:14

## 2025-07-31 RX ADMIN — PIPERACILLIN AND TAZOBACTAM 3375 MG: 3; .375 INJECTION, POWDER, FOR SOLUTION INTRAVENOUS at 05:06

## 2025-07-31 RX ADMIN — CARBIDOPA AND LEVODOPA 2 TABLET: 25; 100 TABLET ORAL at 10:14

## 2025-08-01 LAB
ANION GAP SERPL CALC-SCNC: 9 MMOL/L (ref 2–14)
BASOPHILS # BLD: 0.05 K/UL (ref 0–0.1)
BASOPHILS NFR BLD: 0.6 % (ref 0–1)
BUN SERPL-MCNC: 23 MG/DL (ref 8–23)
BUN/CREAT SERPL: 22 (ref 12–20)
CALCIUM SERPL-MCNC: 8.8 MG/DL (ref 8.8–10.2)
CHLORIDE SERPL-SCNC: 108 MMOL/L (ref 98–107)
CO2 SERPL-SCNC: 26 MMOL/L (ref 20–29)
CREAT SERPL-MCNC: 1.03 MG/DL (ref 0.7–1.2)
DIFFERENTIAL METHOD BLD: ABNORMAL
EOSINOPHIL # BLD: 0.23 K/UL (ref 0–0.4)
EOSINOPHIL NFR BLD: 2.6 % (ref 0–7)
ERYTHROCYTE [DISTWIDTH] IN BLOOD BY AUTOMATED COUNT: 12.9 % (ref 11.5–14.5)
GLUCOSE SERPL-MCNC: 114 MG/DL (ref 65–100)
HCT VFR BLD AUTO: 35.3 % (ref 36.6–50.3)
HGB BLD-MCNC: 11.1 G/DL (ref 12.1–17)
IMM GRANULOCYTES # BLD AUTO: 0.04 K/UL (ref 0–0.04)
IMM GRANULOCYTES NFR BLD AUTO: 0.4 % (ref 0–0.5)
LYMPHOCYTES # BLD: 1.52 K/UL (ref 0.8–3.5)
LYMPHOCYTES NFR BLD: 17.1 % (ref 12–49)
MCH RBC QN AUTO: 27.8 PG (ref 26–34)
MCHC RBC AUTO-ENTMCNC: 31.4 G/DL (ref 30–36.5)
MCV RBC AUTO: 88.3 FL (ref 80–99)
MONOCYTES # BLD: 0.88 K/UL (ref 0–1)
MONOCYTES NFR BLD: 9.9 % (ref 5–13)
NEUTS SEG # BLD: 6.18 K/UL (ref 1.8–8)
NEUTS SEG NFR BLD: 69.4 % (ref 32–75)
NRBC # BLD: 0 K/UL (ref 0–0.01)
NRBC BLD-RTO: 0 PER 100 WBC
PLATELET # BLD AUTO: 158 K/UL (ref 150–400)
PMV BLD AUTO: 10.1 FL (ref 8.9–12.9)
POTASSIUM SERPL-SCNC: 3.7 MMOL/L (ref 3.5–5.1)
RBC # BLD AUTO: 4 M/UL (ref 4.1–5.7)
SODIUM SERPL-SCNC: 143 MMOL/L (ref 136–145)
WBC # BLD AUTO: 8.9 K/UL (ref 4.1–11.1)

## 2025-08-01 PROCEDURE — 85025 COMPLETE CBC W/AUTO DIFF WBC: CPT

## 2025-08-01 PROCEDURE — 2580000003 HC RX 258: Performed by: INTERNAL MEDICINE

## 2025-08-01 PROCEDURE — 1100000000 HC RM PRIVATE

## 2025-08-01 PROCEDURE — 2500000003 HC RX 250 WO HCPCS: Performed by: FAMILY MEDICINE

## 2025-08-01 PROCEDURE — 6370000000 HC RX 637 (ALT 250 FOR IP): Performed by: INTERNAL MEDICINE

## 2025-08-01 PROCEDURE — 6370000000 HC RX 637 (ALT 250 FOR IP): Performed by: STUDENT IN AN ORGANIZED HEALTH CARE EDUCATION/TRAINING PROGRAM

## 2025-08-01 PROCEDURE — 97530 THERAPEUTIC ACTIVITIES: CPT

## 2025-08-01 PROCEDURE — 6370000000 HC RX 637 (ALT 250 FOR IP): Performed by: FAMILY MEDICINE

## 2025-08-01 PROCEDURE — 80048 BASIC METABOLIC PNL TOTAL CA: CPT

## 2025-08-01 PROCEDURE — 6370000000 HC RX 637 (ALT 250 FOR IP): Performed by: NURSE PRACTITIONER

## 2025-08-01 PROCEDURE — 97535 SELF CARE MNGMENT TRAINING: CPT | Performed by: OCCUPATIONAL THERAPIST

## 2025-08-01 PROCEDURE — 2580000003 HC RX 258: Performed by: STUDENT IN AN ORGANIZED HEALTH CARE EDUCATION/TRAINING PROGRAM

## 2025-08-01 PROCEDURE — 6360000002 HC RX W HCPCS: Performed by: INTERNAL MEDICINE

## 2025-08-01 PROCEDURE — 6360000002 HC RX W HCPCS: Performed by: STUDENT IN AN ORGANIZED HEALTH CARE EDUCATION/TRAINING PROGRAM

## 2025-08-01 PROCEDURE — 97530 THERAPEUTIC ACTIVITIES: CPT | Performed by: OCCUPATIONAL THERAPIST

## 2025-08-01 PROCEDURE — 2500000003 HC RX 250 WO HCPCS: Performed by: STUDENT IN AN ORGANIZED HEALTH CARE EDUCATION/TRAINING PROGRAM

## 2025-08-01 PROCEDURE — 94760 N-INVAS EAR/PLS OXIMETRY 1: CPT

## 2025-08-01 RX ADMIN — CARBIDOPA AND LEVODOPA 2 TABLET: 25; 100 TABLET ORAL at 17:59

## 2025-08-01 RX ADMIN — SODIUM CHLORIDE, PRESERVATIVE FREE 10 ML: 5 INJECTION INTRAVENOUS at 21:26

## 2025-08-01 RX ADMIN — FINASTERIDE 5 MG: 5 TABLET, FILM COATED ORAL at 08:31

## 2025-08-01 RX ADMIN — Medication 1000 UNITS: at 08:31

## 2025-08-01 RX ADMIN — CARBIDOPA AND LEVODOPA 2 TABLET: 25; 100 TABLET ORAL at 21:23

## 2025-08-01 RX ADMIN — BACITRACIN: 500 OINTMENT TOPICAL at 08:31

## 2025-08-01 RX ADMIN — SODIUM CHLORIDE, PRESERVATIVE FREE 10 ML: 5 INJECTION INTRAVENOUS at 14:09

## 2025-08-01 RX ADMIN — CARBIDOPA AND LEVODOPA 2 TABLET: 25; 100 TABLET ORAL at 08:31

## 2025-08-01 RX ADMIN — SODIUM CHLORIDE, PRESERVATIVE FREE 10 ML: 5 INJECTION INTRAVENOUS at 08:32

## 2025-08-01 RX ADMIN — PIPERACILLIN AND TAZOBACTAM 3375 MG: 3; .375 INJECTION, POWDER, FOR SOLUTION INTRAVENOUS at 05:12

## 2025-08-01 RX ADMIN — PIPERACILLIN AND TAZOBACTAM 3375 MG: 3; .375 INJECTION, POWDER, FOR SOLUTION INTRAVENOUS at 21:24

## 2025-08-01 RX ADMIN — CYANOCOBALAMIN TAB 500 MCG 500 MCG: 500 TAB at 08:32

## 2025-08-01 RX ADMIN — PIPERACILLIN AND TAZOBACTAM 3375 MG: 3; .375 INJECTION, POWDER, FOR SOLUTION INTRAVENOUS at 14:04

## 2025-08-01 RX ADMIN — CARBIDOPA AND LEVODOPA 2 TABLET: 25; 100 TABLET ORAL at 14:00

## 2025-08-02 LAB
ANION GAP SERPL CALC-SCNC: 9 MMOL/L (ref 2–14)
BASOPHILS # BLD: 0.05 K/UL (ref 0–0.1)
BASOPHILS NFR BLD: 0.5 % (ref 0–1)
BUN SERPL-MCNC: 24 MG/DL (ref 8–23)
BUN/CREAT SERPL: 24 (ref 12–20)
CALCIUM SERPL-MCNC: 8.8 MG/DL (ref 8.8–10.2)
CHLORIDE SERPL-SCNC: 108 MMOL/L (ref 98–107)
CO2 SERPL-SCNC: 25 MMOL/L (ref 20–29)
CREAT SERPL-MCNC: 1.03 MG/DL (ref 0.7–1.2)
DIFFERENTIAL METHOD BLD: ABNORMAL
EOSINOPHIL # BLD: 0.2 K/UL (ref 0–0.4)
EOSINOPHIL NFR BLD: 2.1 % (ref 0–7)
ERYTHROCYTE [DISTWIDTH] IN BLOOD BY AUTOMATED COUNT: 13.1 % (ref 11.5–14.5)
GLUCOSE SERPL-MCNC: 113 MG/DL (ref 65–100)
HCT VFR BLD AUTO: 33.8 % (ref 36.6–50.3)
HGB BLD-MCNC: 10.4 G/DL (ref 12.1–17)
IMM GRANULOCYTES # BLD AUTO: 0.05 K/UL (ref 0–0.04)
IMM GRANULOCYTES NFR BLD AUTO: 0.5 % (ref 0–0.5)
LYMPHOCYTES # BLD: 1.61 K/UL (ref 0.8–3.5)
LYMPHOCYTES NFR BLD: 17.3 % (ref 12–49)
MCH RBC QN AUTO: 27.1 PG (ref 26–34)
MCHC RBC AUTO-ENTMCNC: 30.8 G/DL (ref 30–36.5)
MCV RBC AUTO: 88 FL (ref 80–99)
MONOCYTES # BLD: 1.12 K/UL (ref 0–1)
MONOCYTES NFR BLD: 12 % (ref 5–13)
NEUTS SEG # BLD: 6.3 K/UL (ref 1.8–8)
NEUTS SEG NFR BLD: 67.6 % (ref 32–75)
NRBC # BLD: 0 K/UL (ref 0–0.01)
NRBC BLD-RTO: 0 PER 100 WBC
PLATELET # BLD AUTO: 194 K/UL (ref 150–400)
PMV BLD AUTO: 9.8 FL (ref 8.9–12.9)
POTASSIUM SERPL-SCNC: 4 MMOL/L (ref 3.5–5.1)
RBC # BLD AUTO: 3.84 M/UL (ref 4.1–5.7)
SODIUM SERPL-SCNC: 142 MMOL/L (ref 136–145)
WBC # BLD AUTO: 9.3 K/UL (ref 4.1–11.1)

## 2025-08-02 PROCEDURE — 2500000003 HC RX 250 WO HCPCS: Performed by: FAMILY MEDICINE

## 2025-08-02 PROCEDURE — 6370000000 HC RX 637 (ALT 250 FOR IP): Performed by: NURSE PRACTITIONER

## 2025-08-02 PROCEDURE — 6360000002 HC RX W HCPCS: Performed by: STUDENT IN AN ORGANIZED HEALTH CARE EDUCATION/TRAINING PROGRAM

## 2025-08-02 PROCEDURE — 6370000000 HC RX 637 (ALT 250 FOR IP): Performed by: STUDENT IN AN ORGANIZED HEALTH CARE EDUCATION/TRAINING PROGRAM

## 2025-08-02 PROCEDURE — 6370000000 HC RX 637 (ALT 250 FOR IP): Performed by: INTERNAL MEDICINE

## 2025-08-02 PROCEDURE — 80048 BASIC METABOLIC PNL TOTAL CA: CPT

## 2025-08-02 PROCEDURE — 85025 COMPLETE CBC W/AUTO DIFF WBC: CPT

## 2025-08-02 PROCEDURE — 2580000003 HC RX 258: Performed by: STUDENT IN AN ORGANIZED HEALTH CARE EDUCATION/TRAINING PROGRAM

## 2025-08-02 PROCEDURE — 2500000003 HC RX 250 WO HCPCS: Performed by: STUDENT IN AN ORGANIZED HEALTH CARE EDUCATION/TRAINING PROGRAM

## 2025-08-02 PROCEDURE — 94760 N-INVAS EAR/PLS OXIMETRY 1: CPT

## 2025-08-02 PROCEDURE — 6370000000 HC RX 637 (ALT 250 FOR IP): Performed by: FAMILY MEDICINE

## 2025-08-02 PROCEDURE — 1100000000 HC RM PRIVATE

## 2025-08-02 RX ADMIN — SODIUM CHLORIDE, PRESERVATIVE FREE 10 ML: 5 INJECTION INTRAVENOUS at 08:46

## 2025-08-02 RX ADMIN — CYANOCOBALAMIN TAB 500 MCG 500 MCG: 500 TAB at 08:45

## 2025-08-02 RX ADMIN — CARBIDOPA AND LEVODOPA 2 TABLET: 25; 100 TABLET ORAL at 21:02

## 2025-08-02 RX ADMIN — PIPERACILLIN AND TAZOBACTAM 3375 MG: 3; .375 INJECTION, POWDER, FOR SOLUTION INTRAVENOUS at 21:07

## 2025-08-02 RX ADMIN — SODIUM CHLORIDE, PRESERVATIVE FREE 10 ML: 5 INJECTION INTRAVENOUS at 21:04

## 2025-08-02 RX ADMIN — CARBIDOPA AND LEVODOPA 2 TABLET: 25; 100 TABLET ORAL at 17:40

## 2025-08-02 RX ADMIN — PIPERACILLIN AND TAZOBACTAM 3375 MG: 3; .375 INJECTION, POWDER, FOR SOLUTION INTRAVENOUS at 05:31

## 2025-08-02 RX ADMIN — Medication 1000 UNITS: at 08:45

## 2025-08-02 RX ADMIN — PIPERACILLIN AND TAZOBACTAM 3375 MG: 3; .375 INJECTION, POWDER, FOR SOLUTION INTRAVENOUS at 13:13

## 2025-08-02 RX ADMIN — CARBIDOPA AND LEVODOPA 2 TABLET: 25; 100 TABLET ORAL at 13:12

## 2025-08-02 RX ADMIN — BACITRACIN 1 PACKET: 500 OINTMENT TOPICAL at 08:45

## 2025-08-02 RX ADMIN — CARBIDOPA AND LEVODOPA 2 TABLET: 25; 100 TABLET ORAL at 08:45

## 2025-08-02 RX ADMIN — FINASTERIDE 5 MG: 5 TABLET, FILM COATED ORAL at 08:45

## 2025-08-03 ENCOUNTER — APPOINTMENT (OUTPATIENT)
Facility: HOSPITAL | Age: 85
DRG: 871 | End: 2025-08-03
Payer: MEDICARE

## 2025-08-03 LAB
ANION GAP SERPL CALC-SCNC: 11 MMOL/L (ref 2–14)
BASOPHILS # BLD: 0.05 K/UL (ref 0–0.1)
BASOPHILS NFR BLD: 0.5 % (ref 0–1)
BUN SERPL-MCNC: 21 MG/DL (ref 8–23)
BUN/CREAT SERPL: 23 (ref 12–20)
CALCIUM SERPL-MCNC: 9 MG/DL (ref 8.8–10.2)
CHLORIDE SERPL-SCNC: 106 MMOL/L (ref 98–107)
CO2 SERPL-SCNC: 23 MMOL/L (ref 20–29)
CREAT SERPL-MCNC: 0.92 MG/DL (ref 0.7–1.2)
DIFFERENTIAL METHOD BLD: ABNORMAL
EOSINOPHIL # BLD: 0.22 K/UL (ref 0–0.4)
EOSINOPHIL NFR BLD: 2.4 % (ref 0–7)
ERYTHROCYTE [DISTWIDTH] IN BLOOD BY AUTOMATED COUNT: 13.2 % (ref 11.5–14.5)
GLUCOSE BLD STRIP.AUTO-MCNC: 128 MG/DL (ref 65–117)
GLUCOSE SERPL-MCNC: 107 MG/DL (ref 65–100)
HCT VFR BLD AUTO: 34.1 % (ref 36.6–50.3)
HGB BLD-MCNC: 10.5 G/DL (ref 12.1–17)
IMM GRANULOCYTES # BLD AUTO: 0.05 K/UL (ref 0–0.04)
IMM GRANULOCYTES NFR BLD AUTO: 0.5 % (ref 0–0.5)
LYMPHOCYTES # BLD: 1.53 K/UL (ref 0.8–3.5)
LYMPHOCYTES NFR BLD: 16.6 % (ref 12–49)
MCH RBC QN AUTO: 27.2 PG (ref 26–34)
MCHC RBC AUTO-ENTMCNC: 30.8 G/DL (ref 30–36.5)
MCV RBC AUTO: 88.3 FL (ref 80–99)
MONOCYTES # BLD: 0.82 K/UL (ref 0–1)
MONOCYTES NFR BLD: 8.9 % (ref 5–13)
NEUTS SEG # BLD: 6.56 K/UL (ref 1.8–8)
NEUTS SEG NFR BLD: 71.1 % (ref 32–75)
NRBC # BLD: 0 K/UL (ref 0–0.01)
NRBC BLD-RTO: 0 PER 100 WBC
PLATELET # BLD AUTO: 214 K/UL (ref 150–400)
PMV BLD AUTO: 9.9 FL (ref 8.9–12.9)
POTASSIUM SERPL-SCNC: 4.5 MMOL/L (ref 3.5–5.1)
RBC # BLD AUTO: 3.86 M/UL (ref 4.1–5.7)
SERVICE CMNT-IMP: ABNORMAL
SODIUM SERPL-SCNC: 140 MMOL/L (ref 136–145)
WBC # BLD AUTO: 9.2 K/UL (ref 4.1–11.1)

## 2025-08-03 PROCEDURE — 6370000000 HC RX 637 (ALT 250 FOR IP): Performed by: NURSE PRACTITIONER

## 2025-08-03 PROCEDURE — APPNB60 APP NON BILLABLE TIME 46-60 MINS: Performed by: NURSE PRACTITIONER

## 2025-08-03 PROCEDURE — 6370000000 HC RX 637 (ALT 250 FOR IP): Performed by: STUDENT IN AN ORGANIZED HEALTH CARE EDUCATION/TRAINING PROGRAM

## 2025-08-03 PROCEDURE — 6370000000 HC RX 637 (ALT 250 FOR IP): Performed by: INTERNAL MEDICINE

## 2025-08-03 PROCEDURE — 1100000000 HC RM PRIVATE

## 2025-08-03 PROCEDURE — 80048 BASIC METABOLIC PNL TOTAL CA: CPT

## 2025-08-03 PROCEDURE — 2500000003 HC RX 250 WO HCPCS: Performed by: STUDENT IN AN ORGANIZED HEALTH CARE EDUCATION/TRAINING PROGRAM

## 2025-08-03 PROCEDURE — 70450 CT HEAD/BRAIN W/O DYE: CPT

## 2025-08-03 PROCEDURE — 0042T CT BRAIN PERFUSION: CPT

## 2025-08-03 PROCEDURE — 6370000000 HC RX 637 (ALT 250 FOR IP): Performed by: FAMILY MEDICINE

## 2025-08-03 PROCEDURE — 85025 COMPLETE CBC W/AUTO DIFF WBC: CPT

## 2025-08-03 PROCEDURE — 70498 CT ANGIOGRAPHY NECK: CPT

## 2025-08-03 PROCEDURE — 4A03X5D MEASUREMENT OF ARTERIAL FLOW, INTRACRANIAL, EXTERNAL APPROACH: ICD-10-PCS | Performed by: STUDENT IN AN ORGANIZED HEALTH CARE EDUCATION/TRAINING PROGRAM

## 2025-08-03 PROCEDURE — 82962 GLUCOSE BLOOD TEST: CPT

## 2025-08-03 PROCEDURE — 2500000003 HC RX 250 WO HCPCS: Performed by: FAMILY MEDICINE

## 2025-08-03 RX ORDER — ASPIRIN 81 MG/1
81 TABLET, CHEWABLE ORAL DAILY
Status: DISCONTINUED | OUTPATIENT
Start: 2025-08-04 | End: 2025-08-05 | Stop reason: HOSPADM

## 2025-08-03 RX ORDER — IOPAMIDOL 755 MG/ML
100 INJECTION, SOLUTION INTRAVASCULAR
Status: DISCONTINUED | OUTPATIENT
Start: 2025-08-03 | End: 2025-08-05 | Stop reason: HOSPADM

## 2025-08-03 RX ORDER — ASPIRIN 325 MG
325 TABLET ORAL ONCE
Status: COMPLETED | OUTPATIENT
Start: 2025-08-03 | End: 2025-08-03

## 2025-08-03 RX ADMIN — BACITRACIN 1 PACKET: 500 OINTMENT TOPICAL at 08:33

## 2025-08-03 RX ADMIN — CARBIDOPA AND LEVODOPA 2 TABLET: 25; 100 TABLET ORAL at 20:42

## 2025-08-03 RX ADMIN — CARBIDOPA AND LEVODOPA 2 TABLET: 25; 100 TABLET ORAL at 13:15

## 2025-08-03 RX ADMIN — ASPIRIN 325 MG: 325 TABLET ORAL at 16:17

## 2025-08-03 RX ADMIN — FINASTERIDE 5 MG: 5 TABLET, FILM COATED ORAL at 08:32

## 2025-08-03 RX ADMIN — SODIUM CHLORIDE, PRESERVATIVE FREE 10 ML: 5 INJECTION INTRAVENOUS at 08:33

## 2025-08-03 RX ADMIN — CYANOCOBALAMIN TAB 500 MCG 500 MCG: 500 TAB at 08:32

## 2025-08-03 RX ADMIN — CARBIDOPA AND LEVODOPA 2 TABLET: 25; 100 TABLET ORAL at 08:33

## 2025-08-03 RX ADMIN — SODIUM CHLORIDE, PRESERVATIVE FREE 10 ML: 5 INJECTION INTRAVENOUS at 20:44

## 2025-08-03 RX ADMIN — Medication 1000 UNITS: at 08:33

## 2025-08-03 RX ADMIN — CARBIDOPA AND LEVODOPA 2 TABLET: 25; 100 TABLET ORAL at 16:17

## 2025-08-04 ENCOUNTER — APPOINTMENT (OUTPATIENT)
Facility: HOSPITAL | Age: 85
DRG: 871 | End: 2025-08-04
Payer: MEDICARE

## 2025-08-04 LAB
ANION GAP SERPL CALC-SCNC: 9 MMOL/L (ref 2–14)
BASOPHILS # BLD: 0.06 K/UL (ref 0–0.1)
BASOPHILS NFR BLD: 0.8 % (ref 0–1)
BUN SERPL-MCNC: 20 MG/DL (ref 8–23)
BUN/CREAT SERPL: 25 (ref 12–20)
CALCIUM SERPL-MCNC: 9.5 MG/DL (ref 8.8–10.2)
CHLORIDE SERPL-SCNC: 104 MMOL/L (ref 98–107)
CO2 SERPL-SCNC: 28 MMOL/L (ref 20–29)
CREAT SERPL-MCNC: 0.78 MG/DL (ref 0.7–1.2)
DIFFERENTIAL METHOD BLD: ABNORMAL
EOSINOPHIL # BLD: 0.17 K/UL (ref 0–0.4)
EOSINOPHIL NFR BLD: 2.1 % (ref 0–7)
ERYTHROCYTE [DISTWIDTH] IN BLOOD BY AUTOMATED COUNT: 13.2 % (ref 11.5–14.5)
GLUCOSE SERPL-MCNC: 108 MG/DL (ref 65–100)
HCT VFR BLD AUTO: 34.4 % (ref 36.6–50.3)
HGB BLD-MCNC: 10.8 G/DL (ref 12.1–17)
IMM GRANULOCYTES # BLD AUTO: 0.02 K/UL (ref 0–0.04)
IMM GRANULOCYTES NFR BLD AUTO: 0.3 % (ref 0–0.5)
LYMPHOCYTES # BLD: 1.27 K/UL (ref 0.8–3.5)
LYMPHOCYTES NFR BLD: 16 % (ref 12–49)
MCH RBC QN AUTO: 27.9 PG (ref 26–34)
MCHC RBC AUTO-ENTMCNC: 31.4 G/DL (ref 30–36.5)
MCV RBC AUTO: 88.9 FL (ref 80–99)
MONOCYTES # BLD: 0.71 K/UL (ref 0–1)
MONOCYTES NFR BLD: 9 % (ref 5–13)
NEUTS SEG # BLD: 5.69 K/UL (ref 1.8–8)
NEUTS SEG NFR BLD: 71.8 % (ref 32–75)
NRBC # BLD: 0 K/UL (ref 0–0.01)
NRBC BLD-RTO: 0 PER 100 WBC
PLATELET # BLD AUTO: 253 K/UL (ref 150–400)
PMV BLD AUTO: 9.8 FL (ref 8.9–12.9)
POTASSIUM SERPL-SCNC: 4.5 MMOL/L (ref 3.5–5.1)
RBC # BLD AUTO: 3.87 M/UL (ref 4.1–5.7)
SODIUM SERPL-SCNC: 140 MMOL/L (ref 136–145)
WBC # BLD AUTO: 7.9 K/UL (ref 4.1–11.1)

## 2025-08-04 PROCEDURE — 85025 COMPLETE CBC W/AUTO DIFF WBC: CPT

## 2025-08-04 PROCEDURE — 80048 BASIC METABOLIC PNL TOTAL CA: CPT

## 2025-08-04 PROCEDURE — 97535 SELF CARE MNGMENT TRAINING: CPT

## 2025-08-04 PROCEDURE — 2500000003 HC RX 250 WO HCPCS: Performed by: FAMILY MEDICINE

## 2025-08-04 PROCEDURE — 1100000000 HC RM PRIVATE

## 2025-08-04 PROCEDURE — 94760 N-INVAS EAR/PLS OXIMETRY 1: CPT

## 2025-08-04 PROCEDURE — 2500000003 HC RX 250 WO HCPCS: Performed by: STUDENT IN AN ORGANIZED HEALTH CARE EDUCATION/TRAINING PROGRAM

## 2025-08-04 PROCEDURE — 97116 GAIT TRAINING THERAPY: CPT

## 2025-08-04 PROCEDURE — 6370000000 HC RX 637 (ALT 250 FOR IP): Performed by: STUDENT IN AN ORGANIZED HEALTH CARE EDUCATION/TRAINING PROGRAM

## 2025-08-04 PROCEDURE — 70551 MRI BRAIN STEM W/O DYE: CPT

## 2025-08-04 PROCEDURE — 6370000000 HC RX 637 (ALT 250 FOR IP): Performed by: INTERNAL MEDICINE

## 2025-08-04 PROCEDURE — 6370000000 HC RX 637 (ALT 250 FOR IP): Performed by: NURSE PRACTITIONER

## 2025-08-04 PROCEDURE — 6370000000 HC RX 637 (ALT 250 FOR IP): Performed by: FAMILY MEDICINE

## 2025-08-04 RX ORDER — ATORVASTATIN CALCIUM 40 MG/1
40 TABLET, FILM COATED ORAL DAILY
Status: DISCONTINUED | OUTPATIENT
Start: 2025-08-04 | End: 2025-08-05 | Stop reason: HOSPADM

## 2025-08-04 RX ADMIN — CARBIDOPA AND LEVODOPA 2 TABLET: 25; 100 TABLET ORAL at 20:54

## 2025-08-04 RX ADMIN — ATORVASTATIN CALCIUM 40 MG: 40 TABLET, FILM COATED ORAL at 08:18

## 2025-08-04 RX ADMIN — CARBIDOPA AND LEVODOPA 2 TABLET: 25; 100 TABLET ORAL at 14:16

## 2025-08-04 RX ADMIN — SODIUM CHLORIDE, PRESERVATIVE FREE 10 ML: 5 INJECTION INTRAVENOUS at 08:18

## 2025-08-04 RX ADMIN — SODIUM CHLORIDE, PRESERVATIVE FREE 10 ML: 5 INJECTION INTRAVENOUS at 20:54

## 2025-08-04 RX ADMIN — BACITRACIN 1 PACKET: 500 OINTMENT TOPICAL at 08:23

## 2025-08-04 RX ADMIN — CYANOCOBALAMIN TAB 500 MCG 500 MCG: 500 TAB at 08:18

## 2025-08-04 RX ADMIN — ASPIRIN 81 MG: 81 TABLET, CHEWABLE ORAL at 08:17

## 2025-08-04 RX ADMIN — CARBIDOPA AND LEVODOPA 2 TABLET: 25; 100 TABLET ORAL at 08:18

## 2025-08-04 RX ADMIN — Medication 1000 UNITS: at 08:17

## 2025-08-04 RX ADMIN — CARBIDOPA AND LEVODOPA 2 TABLET: 25; 100 TABLET ORAL at 17:18

## 2025-08-04 RX ADMIN — FINASTERIDE 5 MG: 5 TABLET, FILM COATED ORAL at 08:18

## 2025-08-04 ASSESSMENT — PAIN SCALES - PAIN ASSESSMENT IN ADVANCED DEMENTIA (PAINAD)
TOTALSCORE: 0
CONSOLABILITY: NO NEED TO CONSOLE
BODYLANGUAGE: RELAXED
FACIALEXPRESSION: SMILING OR INEXPRESSIVE
BREATHING: NORMAL

## 2025-08-05 VITALS
DIASTOLIC BLOOD PRESSURE: 55 MMHG | SYSTOLIC BLOOD PRESSURE: 90 MMHG | RESPIRATION RATE: 16 BRPM | OXYGEN SATURATION: 96 % | BODY MASS INDEX: 22.05 KG/M2 | WEIGHT: 145.5 LBS | HEIGHT: 68 IN | TEMPERATURE: 97.8 F | HEART RATE: 73 BPM

## 2025-08-05 PROBLEM — A41.9 SEPSIS (HCC): Status: RESOLVED | Noted: 2025-07-23 | Resolved: 2025-08-05

## 2025-08-05 PROCEDURE — 6370000000 HC RX 637 (ALT 250 FOR IP): Performed by: STUDENT IN AN ORGANIZED HEALTH CARE EDUCATION/TRAINING PROGRAM

## 2025-08-05 PROCEDURE — 94760 N-INVAS EAR/PLS OXIMETRY 1: CPT

## 2025-08-05 PROCEDURE — 6370000000 HC RX 637 (ALT 250 FOR IP): Performed by: NURSE PRACTITIONER

## 2025-08-05 PROCEDURE — 6370000000 HC RX 637 (ALT 250 FOR IP): Performed by: FAMILY MEDICINE

## 2025-08-05 PROCEDURE — 6370000000 HC RX 637 (ALT 250 FOR IP): Performed by: INTERNAL MEDICINE

## 2025-08-05 PROCEDURE — 2500000003 HC RX 250 WO HCPCS: Performed by: STUDENT IN AN ORGANIZED HEALTH CARE EDUCATION/TRAINING PROGRAM

## 2025-08-05 RX ORDER — ATORVASTATIN CALCIUM 40 MG/1
40 TABLET, FILM COATED ORAL DAILY
DISCHARGE
Start: 2025-08-06

## 2025-08-05 RX ADMIN — ASPIRIN 81 MG: 81 TABLET, CHEWABLE ORAL at 09:50

## 2025-08-05 RX ADMIN — CYANOCOBALAMIN TAB 500 MCG 500 MCG: 500 TAB at 09:50

## 2025-08-05 RX ADMIN — ATORVASTATIN CALCIUM 40 MG: 40 TABLET, FILM COATED ORAL at 09:50

## 2025-08-05 RX ADMIN — FINASTERIDE 5 MG: 5 TABLET, FILM COATED ORAL at 09:51

## 2025-08-05 RX ADMIN — BACITRACIN 1 PACKET: 500 OINTMENT TOPICAL at 09:52

## 2025-08-05 RX ADMIN — SODIUM CHLORIDE, PRESERVATIVE FREE 10 ML: 5 INJECTION INTRAVENOUS at 09:52

## 2025-08-05 RX ADMIN — CARBIDOPA AND LEVODOPA 2 TABLET: 25; 100 TABLET ORAL at 16:30

## 2025-08-05 RX ADMIN — Medication 1000 UNITS: at 09:51

## 2025-08-05 RX ADMIN — CARBIDOPA AND LEVODOPA 2 TABLET: 25; 100 TABLET ORAL at 09:50

## 2025-08-05 RX ADMIN — CARBIDOPA AND LEVODOPA 2 TABLET: 25; 100 TABLET ORAL at 13:05

## 2025-08-28 ENCOUNTER — APPOINTMENT (OUTPATIENT)
Facility: HOSPITAL | Age: 85
End: 2025-08-28
Payer: MEDICARE

## 2025-08-28 ENCOUNTER — HOSPITAL ENCOUNTER (EMERGENCY)
Facility: HOSPITAL | Age: 85
Discharge: HOME | End: 2025-08-29
Attending: STUDENT IN AN ORGANIZED HEALTH CARE EDUCATION/TRAINING PROGRAM
Payer: MEDICARE

## 2025-08-28 DIAGNOSIS — S31.20XA OPEN WOUND OF PENIS, INITIAL ENCOUNTER: ICD-10-CM

## 2025-08-28 DIAGNOSIS — R05.1 ACUTE COUGH: ICD-10-CM

## 2025-08-28 DIAGNOSIS — J81.0 ACUTE PULMONARY EDEMA (HCC): ICD-10-CM

## 2025-08-28 DIAGNOSIS — Z97.8 CHRONIC INDWELLING FOLEY CATHETER: Primary | ICD-10-CM

## 2025-08-28 LAB
BASOPHILS # BLD: 0.05 K/UL (ref 0–0.1)
BASOPHILS NFR BLD: 0.8 % (ref 0–1)
COMMENT:: NORMAL
DIFFERENTIAL METHOD BLD: ABNORMAL
EOSINOPHIL # BLD: 0.14 K/UL (ref 0–0.4)
EOSINOPHIL NFR BLD: 2.2 % (ref 0–7)
ERYTHROCYTE [DISTWIDTH] IN BLOOD BY AUTOMATED COUNT: 13.6 % (ref 11.5–14.5)
FLUAV RNA SPEC QL NAA+PROBE: NOT DETECTED
FLUBV RNA SPEC QL NAA+PROBE: NOT DETECTED
HCT VFR BLD AUTO: 35.6 % (ref 36.6–50.3)
HGB BLD-MCNC: 11.4 G/DL (ref 12.1–17)
IMM GRANULOCYTES # BLD AUTO: 0.01 K/UL (ref 0–0.04)
IMM GRANULOCYTES NFR BLD AUTO: 0.2 % (ref 0–0.5)
LYMPHOCYTES # BLD: 1.37 K/UL (ref 0.8–3.5)
LYMPHOCYTES NFR BLD: 21.9 % (ref 12–49)
MCH RBC QN AUTO: 27.6 PG (ref 26–34)
MCHC RBC AUTO-ENTMCNC: 32 G/DL (ref 30–36.5)
MCV RBC AUTO: 86.2 FL (ref 80–99)
MONOCYTES # BLD: 0.52 K/UL (ref 0–1)
MONOCYTES NFR BLD: 8.3 % (ref 5–13)
NEUTS SEG # BLD: 4.17 K/UL (ref 1.8–8)
NEUTS SEG NFR BLD: 66.6 % (ref 32–75)
NRBC # BLD: 0 K/UL (ref 0–0.01)
NRBC BLD-RTO: 0 PER 100 WBC
PLATELET # BLD AUTO: 211 K/UL (ref 150–400)
PMV BLD AUTO: 9.2 FL (ref 8.9–12.9)
RBC # BLD AUTO: 4.13 M/UL (ref 4.1–5.7)
SARS-COV-2 RNA RESP QL NAA+PROBE: NOT DETECTED
SOURCE: NORMAL
SPECIMEN HOLD: NORMAL
WBC # BLD AUTO: 6.3 K/UL (ref 4.1–11.1)

## 2025-08-28 PROCEDURE — 86850 RBC ANTIBODY SCREEN: CPT

## 2025-08-28 PROCEDURE — 83880 ASSAY OF NATRIURETIC PEPTIDE: CPT

## 2025-08-28 PROCEDURE — 99285 EMERGENCY DEPT VISIT HI MDM: CPT

## 2025-08-28 PROCEDURE — 71045 X-RAY EXAM CHEST 1 VIEW: CPT

## 2025-08-28 PROCEDURE — 80053 COMPREHEN METABOLIC PANEL: CPT

## 2025-08-28 PROCEDURE — 86901 BLOOD TYPING SEROLOGIC RH(D): CPT

## 2025-08-28 PROCEDURE — 86900 BLOOD TYPING SEROLOGIC ABO: CPT

## 2025-08-28 PROCEDURE — 87636 SARSCOV2 & INF A&B AMP PRB: CPT

## 2025-08-28 PROCEDURE — 85025 COMPLETE CBC W/AUTO DIFF WBC: CPT

## 2025-08-28 PROCEDURE — 84484 ASSAY OF TROPONIN QUANT: CPT

## 2025-08-28 ASSESSMENT — LIFESTYLE VARIABLES
HOW MANY STANDARD DRINKS CONTAINING ALCOHOL DO YOU HAVE ON A TYPICAL DAY: PATIENT DOES NOT DRINK
HOW OFTEN DO YOU HAVE A DRINK CONTAINING ALCOHOL: NEVER

## 2025-08-29 VITALS
BODY MASS INDEX: 22.32 KG/M2 | DIASTOLIC BLOOD PRESSURE: 80 MMHG | HEIGHT: 68 IN | RESPIRATION RATE: 18 BRPM | SYSTOLIC BLOOD PRESSURE: 150 MMHG | TEMPERATURE: 98.6 F | WEIGHT: 147.27 LBS | OXYGEN SATURATION: 96 % | HEART RATE: 86 BPM

## 2025-08-29 LAB
ABO + RH BLD: NORMAL
ALBUMIN SERPL-MCNC: 2.9 G/DL (ref 3.5–5.2)
ALBUMIN/GLOB SERPL: 0.7 (ref 1.1–2.2)
ALP SERPL-CCNC: 112 U/L (ref 40–129)
ALT SERPL-CCNC: <5 U/L (ref 10–50)
ANION GAP SERPL CALC-SCNC: 10 MMOL/L (ref 2–14)
AST SERPL-CCNC: 17 U/L (ref 10–50)
BILIRUB SERPL-MCNC: 0.4 MG/DL (ref 0–1.2)
BLOOD GROUP ANTIBODIES SERPL: NORMAL
BUN SERPL-MCNC: 18 MG/DL (ref 8–23)
BUN/CREAT SERPL: 23 (ref 12–20)
CALCIUM SERPL-MCNC: 9.1 MG/DL (ref 8.8–10.2)
CHLORIDE SERPL-SCNC: 103 MMOL/L (ref 98–107)
CO2 SERPL-SCNC: 25 MMOL/L (ref 20–29)
CREAT SERPL-MCNC: 0.78 MG/DL (ref 0.7–1.2)
EKG ATRIAL RATE: 73 BPM
EKG DIAGNOSIS: NORMAL
EKG P AXIS: 31 DEGREES
EKG P-R INTERVAL: 124 MS
EKG Q-T INTERVAL: 386 MS
EKG QRS DURATION: 84 MS
EKG QTC CALCULATION (BAZETT): 425 MS
EKG R AXIS: 72 DEGREES
EKG T AXIS: 70 DEGREES
EKG VENTRICULAR RATE: 73 BPM
GLOBULIN SER CALC-MCNC: 4 G/DL (ref 2–4)
GLUCOSE SERPL-MCNC: 107 MG/DL (ref 65–100)
NT PRO BNP: 324 PG/ML (ref 0–450)
POTASSIUM SERPL-SCNC: 4.1 MMOL/L (ref 3.5–5.1)
PROT SERPL-MCNC: 6.9 G/DL (ref 6.4–8.3)
SODIUM SERPL-SCNC: 138 MMOL/L (ref 136–145)
SPECIMEN EXP DATE BLD: NORMAL
TROPONIN T SERPL HS-MCNC: 21 NG/L (ref 0–22)

## 2025-08-29 PROCEDURE — 93005 ELECTROCARDIOGRAM TRACING: CPT | Performed by: STUDENT IN AN ORGANIZED HEALTH CARE EDUCATION/TRAINING PROGRAM

## 2025-08-29 RX ORDER — FUROSEMIDE 20 MG/1
20 TABLET ORAL DAILY
Qty: 7 TABLET | Refills: 0 | Status: SHIPPED | OUTPATIENT
Start: 2025-08-29 | End: 2025-09-05

## 2025-08-29 ASSESSMENT — ENCOUNTER SYMPTOMS: COUGH: 1

## (undated) DEVICE — Device

## (undated) DEVICE — NEONATAL-ADULT SPO2 SENSOR: Brand: NELLCOR

## (undated) DEVICE — SET IRRIG L81IN DIA0.195IN UROLOGY Y TYP 2 LD W/ DRIP CHMBR

## (undated) DEVICE — BAG BELONG PT PERS CLEAR HANDL

## (undated) DEVICE — SYRINGE MED 20ML STD CLR PLAS LUERLOCK TIP N CTRL DISP

## (undated) DEVICE — CATHETER URET 5FR L70CM 0.038IN OPN END FOR DRNGE RG

## (undated) DEVICE — NEEDLE HYPO 18GA L1.5IN PNK S STL HUB POLYPR SHLD REG BVL

## (undated) DEVICE — TUBING HYDR IRR --

## (undated) DEVICE — SOLIDIFIER FLUID 3000 CC ABSORB

## (undated) DEVICE — KIT IV STRT W CHLORAPREP PD 1ML

## (undated) DEVICE — SOLUTION IRRIG 1000ML H2O PIC PLAS SHATTERPROOF CONTAINER

## (undated) DEVICE — CANN NASAL O2 CAPNOGRAPHY AD -- FILTERLINE

## (undated) DEVICE — 1200 GUARD II KIT W/5MM TUBE W/O VAC TUBE: Brand: GUARDIAN

## (undated) DEVICE — GLOVE ORANGE PI 8   MSG9080

## (undated) DEVICE — AIRLIFE™ U/CONNECT-IT OXYGEN TUBING 7 FEET (2.1 M) CRUSH-RESISTANT OXYGEN TUBING, VINYL TIPPED: Brand: AIRLIFE™

## (undated) DEVICE — SET ADMIN 16ML TBNG L100IN 2 Y INJ SITE IV PIGGY BK DISP

## (undated) DEVICE — SET EXTN TBNG L BOR 4 W STPCOCK ST 32IN PRIMING VOL 6ML

## (undated) DEVICE — SYRINGE MED 10ML LUERLOCK TIP W/O SFTY DISP

## (undated) DEVICE — QUILTED PREMIUM COMFORT UNDERPAD,EXTRA HEAVY: Brand: WINGS

## (undated) DEVICE — Z DISCONTINUED USE 2751540 TUBING IRRIG L10IN DISP PMP ENDOGATOR

## (undated) DEVICE — SOL IRR SOD CHL 0.9% TITAN XL CNTNR 3000ML

## (undated) DEVICE — GARMENT COMPR M FOR 12-18IN CALF INTMIT SGL BLDR HEMO-FORCE

## (undated) DEVICE — BW-412T DISP COMBO CLEANING BRUSH: Brand: SINGLE USE COMBINATION CLEANING BRUSH

## (undated) DEVICE — CONNECTOR TBNG AUX H2O JET DISP FOR OLY 160/180 SER

## (undated) DEVICE — GUIDEWIRE ENDOSCP L150CM DIA0.035IN TIP 3CM PTFE NIT

## (undated) DEVICE — SOLUTION IRRIGATION STRL H2O 1000 ML UROMATIC CONTAINER

## (undated) DEVICE — ENDO CARRY-ON PROCEDURE KIT INCLUDES ENZYMATIC SPONGE, GAUZE, BIOHAZARD LABEL, TRAY, LUBRICANT, DIRTY SCOPE LABEL, WATER LABEL, TRAY, DRAWSTRING PAD, AND DEFENDO 4-PIECE KIT.: Brand: ENDO CARRY-ON PROCEDURE KIT

## (undated) DEVICE — KENDALL RADIOLUCENT FOAM MONITORING ELECTRODE -RECTANGULAR SHAPE: Brand: KENDALL

## (undated) DEVICE — CATH IV AUTOGRD BC BLU 22GA 25 -- INSYTE